# Patient Record
Sex: FEMALE | URBAN - METROPOLITAN AREA
[De-identification: names, ages, dates, MRNs, and addresses within clinical notes are randomized per-mention and may not be internally consistent; named-entity substitution may affect disease eponyms.]

---

## 2019-07-26 ENCOUNTER — INPATIENT (INPATIENT)
Facility: HOSPITAL | Age: 59
LOS: 2 days | Discharge: HOME | DRG: 379 | End: 2019-07-29
Attending: INTERNAL MEDICINE | Admitting: INTERNAL MEDICINE
Payer: COMMERCIAL

## 2019-07-26 VITALS
TEMPERATURE: 98 F | DIASTOLIC BLOOD PRESSURE: 81 MMHG | RESPIRATION RATE: 16 BRPM | SYSTOLIC BLOOD PRESSURE: 155 MMHG | HEIGHT: 63 IN | OXYGEN SATURATION: 96 % | HEART RATE: 77 BPM | WEIGHT: 149.03 LBS

## 2019-07-26 DIAGNOSIS — K92.2 GASTROINTESTINAL HEMORRHAGE, UNSPECIFIED: ICD-10-CM

## 2019-07-26 DIAGNOSIS — K62.5 HEMORRHAGE OF ANUS AND RECTUM: ICD-10-CM

## 2019-07-26 DIAGNOSIS — Z71.89 OTHER SPECIFIED COUNSELING: ICD-10-CM

## 2019-07-26 DIAGNOSIS — Z29.9 ENCOUNTER FOR PROPHYLACTIC MEASURES, UNSPECIFIED: ICD-10-CM

## 2019-07-26 DIAGNOSIS — Z98.890 OTHER SPECIFIED POSTPROCEDURAL STATES: Chronic | ICD-10-CM

## 2019-07-26 DIAGNOSIS — C50.919 MALIGNANT NEOPLASM OF UNSPECIFIED SITE OF UNSPECIFIED FEMALE BREAST: ICD-10-CM

## 2019-07-26 DIAGNOSIS — R10.13 EPIGASTRIC PAIN: ICD-10-CM

## 2019-07-26 DIAGNOSIS — Z90.49 ACQUIRED ABSENCE OF OTHER SPECIFIED PARTS OF DIGESTIVE TRACT: Chronic | ICD-10-CM

## 2019-07-26 DIAGNOSIS — I10 ESSENTIAL (PRIMARY) HYPERTENSION: ICD-10-CM

## 2019-07-26 LAB
ALBUMIN SERPL ELPH-MCNC: 3.5 G/DL — SIGNIFICANT CHANGE UP (ref 3.5–5)
ALP SERPL-CCNC: 148 U/L — HIGH (ref 40–120)
ALT FLD-CCNC: 29 U/L DA — SIGNIFICANT CHANGE UP (ref 10–60)
ANION GAP SERPL CALC-SCNC: 9 MMOL/L — SIGNIFICANT CHANGE UP (ref 5–17)
APTT BLD: 30.9 SEC — SIGNIFICANT CHANGE UP (ref 27.5–36.3)
AST SERPL-CCNC: 22 U/L — SIGNIFICANT CHANGE UP (ref 10–40)
BILIRUB SERPL-MCNC: 0.4 MG/DL — SIGNIFICANT CHANGE UP (ref 0.2–1.2)
BLD GP AB SCN SERPL QL: SIGNIFICANT CHANGE UP
BUN SERPL-MCNC: 11 MG/DL — SIGNIFICANT CHANGE UP (ref 7–18)
CALCIUM SERPL-MCNC: 8.7 MG/DL — SIGNIFICANT CHANGE UP (ref 8.4–10.5)
CHLORIDE SERPL-SCNC: 106 MMOL/L — SIGNIFICANT CHANGE UP (ref 96–108)
CO2 SERPL-SCNC: 24 MMOL/L — SIGNIFICANT CHANGE UP (ref 22–31)
CREAT SERPL-MCNC: 0.7 MG/DL — SIGNIFICANT CHANGE UP (ref 0.5–1.3)
GLUCOSE SERPL-MCNC: 96 MG/DL — SIGNIFICANT CHANGE UP (ref 70–99)
HCT VFR BLD CALC: 36.6 % — SIGNIFICANT CHANGE UP (ref 34.5–45)
HCT VFR BLD CALC: 37.6 % — SIGNIFICANT CHANGE UP (ref 34.5–45)
HGB BLD-MCNC: 12 G/DL — SIGNIFICANT CHANGE UP (ref 11.5–15.5)
HGB BLD-MCNC: 12.6 G/DL — SIGNIFICANT CHANGE UP (ref 11.5–15.5)
INR BLD: 1.11 RATIO — SIGNIFICANT CHANGE UP (ref 0.88–1.16)
MCHC RBC-ENTMCNC: 29.1 PG — SIGNIFICANT CHANGE UP (ref 27–34)
MCHC RBC-ENTMCNC: 29.3 PG — SIGNIFICANT CHANGE UP (ref 27–34)
MCHC RBC-ENTMCNC: 32.8 GM/DL — SIGNIFICANT CHANGE UP (ref 32–36)
MCHC RBC-ENTMCNC: 33.5 GM/DL — SIGNIFICANT CHANGE UP (ref 32–36)
MCV RBC AUTO: 87.4 FL — SIGNIFICANT CHANGE UP (ref 80–100)
MCV RBC AUTO: 88.6 FL — SIGNIFICANT CHANGE UP (ref 80–100)
NRBC # BLD: 0 /100 WBCS — SIGNIFICANT CHANGE UP (ref 0–0)
OB PNL STL: NEGATIVE — SIGNIFICANT CHANGE UP
PLATELET # BLD AUTO: 298 K/UL — SIGNIFICANT CHANGE UP (ref 150–400)
PLATELET # BLD AUTO: 319 K/UL — SIGNIFICANT CHANGE UP (ref 150–400)
POTASSIUM SERPL-MCNC: 3.9 MMOL/L — SIGNIFICANT CHANGE UP (ref 3.5–5.3)
POTASSIUM SERPL-SCNC: 3.9 MMOL/L — SIGNIFICANT CHANGE UP (ref 3.5–5.3)
PROT SERPL-MCNC: 7.5 G/DL — SIGNIFICANT CHANGE UP (ref 6–8.3)
PROTHROM AB SERPL-ACNC: 12.4 SEC — SIGNIFICANT CHANGE UP (ref 10–12.9)
RBC # BLD: 4.13 M/UL — SIGNIFICANT CHANGE UP (ref 3.8–5.2)
RBC # BLD: 4.3 M/UL — SIGNIFICANT CHANGE UP (ref 3.8–5.2)
RBC # FLD: 13.8 % — SIGNIFICANT CHANGE UP (ref 10.3–14.5)
RBC # FLD: 14.1 % — SIGNIFICANT CHANGE UP (ref 10.3–14.5)
SODIUM SERPL-SCNC: 139 MMOL/L — SIGNIFICANT CHANGE UP (ref 135–145)
WBC # BLD: 5.46 K/UL — SIGNIFICANT CHANGE UP (ref 3.8–10.5)
WBC # BLD: 6.35 K/UL — SIGNIFICANT CHANGE UP (ref 3.8–10.5)
WBC # FLD AUTO: 5.46 K/UL — SIGNIFICANT CHANGE UP (ref 3.8–10.5)
WBC # FLD AUTO: 6.35 K/UL — SIGNIFICANT CHANGE UP (ref 3.8–10.5)

## 2019-07-26 PROCEDURE — 99283 EMERGENCY DEPT VISIT LOW MDM: CPT

## 2019-07-26 RX ORDER — ENOXAPARIN SODIUM 100 MG/ML
40 INJECTION SUBCUTANEOUS DAILY
Refills: 0 | Status: DISCONTINUED | OUTPATIENT
Start: 2019-07-26 | End: 2019-07-26

## 2019-07-26 RX ORDER — LISINOPRIL 2.5 MG/1
20 TABLET ORAL DAILY
Refills: 0 | Status: DISCONTINUED | OUTPATIENT
Start: 2019-07-26 | End: 2019-07-29

## 2019-07-26 RX ORDER — PANTOPRAZOLE SODIUM 20 MG/1
40 TABLET, DELAYED RELEASE ORAL
Refills: 0 | Status: DISCONTINUED | OUTPATIENT
Start: 2019-07-26 | End: 2019-07-29

## 2019-07-26 RX ORDER — SODIUM CHLORIDE 9 MG/ML
1000 INJECTION INTRAMUSCULAR; INTRAVENOUS; SUBCUTANEOUS
Refills: 0 | Status: DISCONTINUED | OUTPATIENT
Start: 2019-07-26 | End: 2019-07-29

## 2019-07-26 NOTE — ED ADULT NURSE NOTE - NSIMPLEMENTINTERV_GEN_ALL_ED
Implemented All Universal Safety Interventions:  [place] to call system. Call bell, personal items and telephone within reach. Instruct patient to call for assistance. Room bathroom lighting operational. Non-slip footwear when patient is off stretcher. Physically safe environment: no spills, clutter or unnecessary equipment. Stretcher in lowest position, wheels locked, appropriate side rails in place.

## 2019-07-26 NOTE — H&P ADULT - NSHPPHYSICALEXAM_GEN_ALL_CORE
Vital Signs Last 24 Hrs  T(C): 36.7 (26 Jul 2019 15:19), Max: 36.8 (26 Jul 2019 12:27)  T(F): 98 (26 Jul 2019 15:19), Max: 98.2 (26 Jul 2019 12:27)  HR: 73 (26 Jul 2019 15:19) (73 - 77)  BP: 110/90 (26 Jul 2019 15:19) (110/90 - 155/81)  BP(mean): --  RR: 17 (26 Jul 2019 15:19) (16 - 17)  SpO2: 96% (26 Jul 2019 15:19) (96% - 96%)        PHYSICAL EXAM:  GENERAL: female in bed in no acute distress   HEENT: Normocephalic;  conjunctivae and sclerae clear; moist mucous membranes;   NECK : supple  CHEST/LUNG: Clear to auscultation bilaterally with good air entry , chemo port on right side   breast: left side: surgical scar present above left nipple and left armpit   HEART: S1 S2  regular; no murmurs, gallops or rubs  ABDOMEN: Soft, tenderness in epigastric region , Nondistended; Bowel sounds present, mid abdominal surgical scar present   EXTREMITIES: no cyanosis; no edema; no calf tenderness  SKIN: warm and dry; no rash  NERVOUS SYSTEM:  Awake and alert; Oriented  to place, person and time ; no new deficits Vital Signs Last 24 Hrs  T(C): 36.7 (26 Jul 2019 15:19), Max: 36.8 (26 Jul 2019 12:27)  T(F): 98 (26 Jul 2019 15:19), Max: 98.2 (26 Jul 2019 12:27)  HR: 73 (26 Jul 2019 15:19) (73 - 77)  BP: 110/90 (26 Jul 2019 15:19) (110/90 - 155/81)  RR: 17 (26 Jul 2019 15:19) (16 - 17)  SpO2: 96% (26 Jul 2019 15:19) (96% - 96%)        PHYSICAL EXAM:  GENERAL: elderly female in bed in no acute distress   HEENT: Normocephalic;  conjunctivae and sclerae clear; moist mucous membranes;   NECK : supple  CHEST/LUNG: Clear to auscultation bilaterally with good air entry , chemo port on right side   breast: left side: surgical scar present above left nipple and left armpit   HEART: S1 S2  regular; no murmurs, gallops or rubs  ABDOMEN: Soft, tenderness in epigastric region , Nondistended; Bowel sounds present, mid abdominal surgical scar present   EXTREMITIES: no cyanosis; no edema; no calf tenderness  SKIN: warm and dry; no rash  NERVOUS SYSTEM:  Awake and alert; Oriented  to place, person and time ; no new deficits

## 2019-07-26 NOTE — H&P ADULT - PROBLEM SELECTOR PLAN 2
-Epigastric pain, radiating to throat,   - EGD in April : gastritis   -c/w protonix   Dr Lopez consulted

## 2019-07-26 NOTE — H&P ADULT - PROBLEM SELECTOR PLAN 6
IMPROVE VTE Individual Risk Assessment  RISK                                                                Points  [  ] Previous VTE                                                  3  [  ] Thrombophilia                                               2  [  ] Lower limb paralysis                                      2        (unable to hold up >15 seconds)    [x  ] Current Cancer                                              2         (within 6 months)  [x  ] Immobilization > 24 hrs                                1  [  ] ICU/CCU stay > 24 hours                              1  [x  ] Age > 60                                                      1  IMPROVE VTE Score _____4, but DVT proph held in setting of GI bleed

## 2019-07-26 NOTE — ED ADULT NURSE NOTE - ED STAT RN HANDOFF DETAILS
Patient admitted to medicine in no acute distress assigned to 6 s report given to NIKOLAY Garcia. Awaiting patient to be transported to floor.

## 2019-07-26 NOTE — ED PROVIDER NOTE - OBJECTIVE STATEMENT
70 yo female with pmh of left sided breast ca on chemo presents to the ED for rectal bleeding for 3 days. Pt states she has rectal bleeding whenever she has a BM, states that she sees bright red blood in the toilet bowel. Pt also states she spit up blood tinged salvia for 3 days.  She denies abd pain, fevers, N/V/D, chest pain, sob, weakness, dizziness, dysuria, hematuria.  Last chemo was on 7/22/19. Spoke with Dr. Graff, pts oncologist who states that he wants the pt admitted for further workup.

## 2019-07-26 NOTE — H&P ADULT - ASSESSMENT
68 yo female with PMH of left breast Cancer (Dx in 2000, s/p Lumpectomy/Lymphadenopathy with left breast Cancer relapse in May on chemo presently), HTN sent from blood in stool and  blood tinged saliva and epigastric pain     GOC : DNR/DNI,  [place] in charts

## 2019-07-26 NOTE — H&P ADULT - ATTENDING COMMENTS
Patient seen and examined in ED with daughter at bedside. Patient's history, vitals, labs, imaging studies reviewed. Discussed with above resident, agree with note with edits, educated on the diagnosis and management of above medical conditions. F/u CT A/P. Plan of care discussed with patient, and agrees, all questions answered.   Ankita Rodriguez MD Patient seen and examined in ED with daughter at bedside. Patient's history, vitals, labs, imaging studies reviewed. Discussed with above resident, agree with note with edits, educated on the diagnosis and management of above medical conditions. F/u CT A/P. Plan of care discussed with patient, and daughter at bedside, agrees, all questions answered.   Ankita Rodriguez MD Patient seen and examined in ED with daughter at bedside. Patient's history, vitals, labs, imaging studies reviewed. Discussed with above resident, agree with note with edits, educated on the diagnosis and management of above medical conditions. F/u CT A/P. Plan of care discussed with patient, and daughter at bedside, agrees, all questions answered.   > Advance directives: DNR/DNI. Advance care planning discussion done in detail. Time spent: 35 minutes. MOLST form signed.  Ankita Rodriguez MD

## 2019-07-26 NOTE — H&P ADULT - HISTORY OF PRESENT ILLNESS
68 yo female with PMH of left breast Cancer(Dx in 2000, s/p Lumpectomy/Lymphadenopathy with left breast Cancer relapse in May on chemo presently), HTN sent from blood in stool and  blood tinged saliva. Patient noticed her "stool covered in blood and blood in Toilet bowl" from last 2 days. This morning she also noticed black stool with increased in amount of blood. She also has Lower abdominal pain, 8/10 in severity while passing her bowel from last 3 weeks. She also noticed her saliva being blood tinged but denies any cough from last 3 days. She went to see Dr Graff in OP setting for these complains and was referred to ED. In ED, her rectal EXAM ( performed by ED doc) was negative for any bleeding and fecal occult was negative as well.   Patient was first diagnosed with left breast cancer in 2000, had lumpectomy and Lymphadenopathy, radiation at that time and was on Tamoxifen for 5 years. In May, this year she was found to relapse of left breast cancer, stage 1 and was started on chemo, one a week, 4 course so far, Last day of chemo was on Monday.     She also complains of epigastric pain from last few months. 8/10 in severity, radiating to the throat, worse empty stomach, relieved with meals, present 3-4 times a week but has been more frequent lately. She has history of peptic ulcer 20 years back, treated with Protonix, denies history of GI bleed. She had EGD in April and was normal with some gastritis as per the patient. Her last colonoscopy was in 2016, with some polyp removed and was recommended to have repeat colonoscopy in 5 year.     She complains of ear pain, diarrhea(improved with medication)  since she is been on chemo  but overall has been tolerating chemo well. 68 yo female with PMH of left breast Cancer (Dx in 2000, s/p Lumpectomy/Lymphadenopathy with left breast Cancer relapse in May on chemo presently), HTN sent from blood in stool and  blood tinged saliva. Patient noticed her "stool covered in blood and blood in Toilet bowl" from last 2 days. This morning she also noticed black stool with increased in amount of blood. She also has Lower abdominal pain, 8/10 in severity while passing her bowel from last 3 weeks. She also noticed her saliva being blood tinged but denies any cough from last 3 days. She went to see Dr Graff in OP setting for these complains and was referred to ED. In ED, her rectal EXAM (performed by ED doc) was negative for any bleeding and fecal occult was negative as well.   Patient was first diagnosed with left breast cancer in 2000, had lumpectomy and Lymphadenopathy, radiation at that time and was on Tamoxifen for 5 years. In May, this year she was found to relapse of left breast cancer, stage 1 and was started on chemo, one a week, 4 course so far, Last day of chemo was on Monday.     She also complains of epigastric pain from last few months. 8/10 in severity, radiating to the throat, worse empty stomach, relieved with meals, present 3-4 times a week but has been more frequent lately. She has history of peptic ulcer 20 years back, treated with Protonix, denies history of GI bleed. She had EGD in April and was normal with some gastritis as per the patient. Her last colonoscopy was in 2016, with some polyp removed and was recommended to have repeat colonoscopy in 5 year.     She complains of ear pain, diarrhea (improved with medication)  since she is been on chemo  but overall has been tolerating chemo well.

## 2019-07-26 NOTE — H&P ADULT - PROBLEM SELECTOR PLAN 1
-blood tinged stool from last 2 days  - MT Exam: no blood , FOBT: neg   - Hb 12.6, repeat : 12.0  - will monitor Cbc q12   c/w diet for now   - IV hydration gentle   - Dr Lopez consulted   - CT abdomen  -GI follow up for ?plan for colonoscopy/EGD

## 2019-07-26 NOTE — H&P ADULT - NSICDXPASTSURGICALHX_GEN_ALL_CORE_FT
PAST SURGICAL HISTORY:  H/O lumpectomy left breast lumpectomy with lymphadenectomy    History of cholecystectomy

## 2019-07-26 NOTE — ED ADULT NURSE NOTE - OBJECTIVE STATEMENT
pt is here for rectal bleeding.  pt stated that rectal bleeding since yesterday, denied N/V/D or sob, denied dizziness or chest pain, cancer pt, pt calm at this time.

## 2019-07-27 LAB
24R-OH-CALCIDIOL SERPL-MCNC: 21.9 NG/ML — LOW (ref 30–80)
ALBUMIN SERPL ELPH-MCNC: 3 G/DL — LOW (ref 3.5–5)
ALP SERPL-CCNC: 113 U/L — SIGNIFICANT CHANGE UP (ref 40–120)
ALT FLD-CCNC: 24 U/L DA — SIGNIFICANT CHANGE UP (ref 10–60)
ANION GAP SERPL CALC-SCNC: 4 MMOL/L — LOW (ref 5–17)
APPEARANCE UR: CLEAR — SIGNIFICANT CHANGE UP
AST SERPL-CCNC: 15 U/L — SIGNIFICANT CHANGE UP (ref 10–40)
BASOPHILS # BLD AUTO: 0.02 K/UL — SIGNIFICANT CHANGE UP (ref 0–0.2)
BASOPHILS # BLD AUTO: 0.03 K/UL — SIGNIFICANT CHANGE UP (ref 0–0.2)
BASOPHILS NFR BLD AUTO: 0.4 % — SIGNIFICANT CHANGE UP (ref 0–2)
BASOPHILS NFR BLD AUTO: 0.5 % — SIGNIFICANT CHANGE UP (ref 0–2)
BILIRUB SERPL-MCNC: 0.6 MG/DL — SIGNIFICANT CHANGE UP (ref 0.2–1.2)
BILIRUB UR-MCNC: NEGATIVE — SIGNIFICANT CHANGE UP
BUN SERPL-MCNC: 11 MG/DL — SIGNIFICANT CHANGE UP (ref 7–18)
CALCIUM SERPL-MCNC: 8 MG/DL — LOW (ref 8.4–10.5)
CHLORIDE SERPL-SCNC: 111 MMOL/L — HIGH (ref 96–108)
CHOLEST SERPL-MCNC: 168 MG/DL — SIGNIFICANT CHANGE UP (ref 10–199)
CO2 SERPL-SCNC: 26 MMOL/L — SIGNIFICANT CHANGE UP (ref 22–31)
COLOR SPEC: YELLOW — SIGNIFICANT CHANGE UP
CREAT SERPL-MCNC: 0.71 MG/DL — SIGNIFICANT CHANGE UP (ref 0.5–1.3)
DIFF PNL FLD: NEGATIVE — SIGNIFICANT CHANGE UP
EOSINOPHIL # BLD AUTO: 0.05 K/UL — SIGNIFICANT CHANGE UP (ref 0–0.5)
EOSINOPHIL # BLD AUTO: 0.06 K/UL — SIGNIFICANT CHANGE UP (ref 0–0.5)
EOSINOPHIL NFR BLD AUTO: 0.9 % — SIGNIFICANT CHANGE UP (ref 0–6)
EOSINOPHIL NFR BLD AUTO: 1 % — SIGNIFICANT CHANGE UP (ref 0–6)
FOLATE SERPL-MCNC: >20 NG/ML — SIGNIFICANT CHANGE UP
GLUCOSE SERPL-MCNC: 85 MG/DL — SIGNIFICANT CHANGE UP (ref 70–99)
GLUCOSE UR QL: NEGATIVE — SIGNIFICANT CHANGE UP
HBA1C BLD-MCNC: 5.6 % — SIGNIFICANT CHANGE UP (ref 4–5.6)
HCT VFR BLD CALC: 34.2 % — LOW (ref 34.5–45)
HCT VFR BLD CALC: 35.1 % — SIGNIFICANT CHANGE UP (ref 34.5–45)
HCV AB S/CO SERPL IA: 0.07 S/CO — SIGNIFICANT CHANGE UP (ref 0–0.99)
HCV AB SERPL-IMP: SIGNIFICANT CHANGE UP
HDLC SERPL-MCNC: 38 MG/DL — LOW
HGB BLD-MCNC: 11.2 G/DL — LOW (ref 11.5–15.5)
HGB BLD-MCNC: 11.7 G/DL — SIGNIFICANT CHANGE UP (ref 11.5–15.5)
IMM GRANULOCYTES NFR BLD AUTO: 0.2 % — SIGNIFICANT CHANGE UP (ref 0–1.5)
IMM GRANULOCYTES NFR BLD AUTO: 0.3 % — SIGNIFICANT CHANGE UP (ref 0–1.5)
KETONES UR-MCNC: ABNORMAL
LEUKOCYTE ESTERASE UR-ACNC: ABNORMAL
LIPID PNL WITH DIRECT LDL SERPL: 107 MG/DL — SIGNIFICANT CHANGE UP
LYMPHOCYTES # BLD AUTO: 1.79 K/UL — SIGNIFICANT CHANGE UP (ref 1–3.3)
LYMPHOCYTES # BLD AUTO: 1.88 K/UL — SIGNIFICANT CHANGE UP (ref 1–3.3)
LYMPHOCYTES # BLD AUTO: 28.1 % — SIGNIFICANT CHANGE UP (ref 13–44)
LYMPHOCYTES # BLD AUTO: 36.2 % — SIGNIFICANT CHANGE UP (ref 13–44)
MAGNESIUM SERPL-MCNC: 2.3 MG/DL — SIGNIFICANT CHANGE UP (ref 1.6–2.6)
MCHC RBC-ENTMCNC: 28.9 PG — SIGNIFICANT CHANGE UP (ref 27–34)
MCHC RBC-ENTMCNC: 29.6 PG — SIGNIFICANT CHANGE UP (ref 27–34)
MCHC RBC-ENTMCNC: 32.7 GM/DL — SIGNIFICANT CHANGE UP (ref 32–36)
MCHC RBC-ENTMCNC: 33.3 GM/DL — SIGNIFICANT CHANGE UP (ref 32–36)
MCV RBC AUTO: 88.4 FL — SIGNIFICANT CHANGE UP (ref 80–100)
MCV RBC AUTO: 88.9 FL — SIGNIFICANT CHANGE UP (ref 80–100)
MONOCYTES # BLD AUTO: 0.23 K/UL — SIGNIFICANT CHANGE UP (ref 0–0.9)
MONOCYTES # BLD AUTO: 0.24 K/UL — SIGNIFICANT CHANGE UP (ref 0–0.9)
MONOCYTES NFR BLD AUTO: 3.6 % — SIGNIFICANT CHANGE UP (ref 2–14)
MONOCYTES NFR BLD AUTO: 4.6 % — SIGNIFICANT CHANGE UP (ref 2–14)
NEUTROPHILS # BLD AUTO: 3 K/UL — SIGNIFICANT CHANGE UP (ref 1.8–7.4)
NEUTROPHILS # BLD AUTO: 4.23 K/UL — SIGNIFICANT CHANGE UP (ref 1.8–7.4)
NEUTROPHILS NFR BLD AUTO: 57.6 % — SIGNIFICANT CHANGE UP (ref 43–77)
NEUTROPHILS NFR BLD AUTO: 66.6 % — SIGNIFICANT CHANGE UP (ref 43–77)
NITRITE UR-MCNC: NEGATIVE — SIGNIFICANT CHANGE UP
NRBC # BLD: 0 /100 WBCS — SIGNIFICANT CHANGE UP (ref 0–0)
PH UR: 7 — SIGNIFICANT CHANGE UP (ref 5–8)
PHOSPHATE SERPL-MCNC: 3.3 MG/DL — SIGNIFICANT CHANGE UP (ref 2.5–4.5)
PLATELET # BLD AUTO: 273 K/UL — SIGNIFICANT CHANGE UP (ref 150–400)
PLATELET # BLD AUTO: 286 K/UL — SIGNIFICANT CHANGE UP (ref 150–400)
POTASSIUM SERPL-MCNC: 3.8 MMOL/L — SIGNIFICANT CHANGE UP (ref 3.5–5.3)
POTASSIUM SERPL-SCNC: 3.8 MMOL/L — SIGNIFICANT CHANGE UP (ref 3.5–5.3)
PROT SERPL-MCNC: 6.1 G/DL — SIGNIFICANT CHANGE UP (ref 6–8.3)
PROT UR-MCNC: NEGATIVE — SIGNIFICANT CHANGE UP
RBC # BLD: 3.87 M/UL — SIGNIFICANT CHANGE UP (ref 3.8–5.2)
RBC # BLD: 3.95 M/UL — SIGNIFICANT CHANGE UP (ref 3.8–5.2)
RBC # FLD: 13.8 % — SIGNIFICANT CHANGE UP (ref 10.3–14.5)
RBC # FLD: 13.9 % — SIGNIFICANT CHANGE UP (ref 10.3–14.5)
SODIUM SERPL-SCNC: 141 MMOL/L — SIGNIFICANT CHANGE UP (ref 135–145)
SP GR SPEC: 1 — LOW (ref 1.01–1.02)
TOTAL CHOLESTEROL/HDL RATIO MEASUREMENT: 4.4 RATIO — SIGNIFICANT CHANGE UP (ref 3.3–7.1)
TRIGL SERPL-MCNC: 114 MG/DL — SIGNIFICANT CHANGE UP (ref 10–149)
TSH SERPL-MCNC: 1.66 UU/ML — SIGNIFICANT CHANGE UP (ref 0.34–4.82)
UROBILINOGEN FLD QL: NEGATIVE — SIGNIFICANT CHANGE UP
VIT B12 SERPL-MCNC: 535 PG/ML — SIGNIFICANT CHANGE UP (ref 232–1245)
WBC # BLD: 5.2 K/UL — SIGNIFICANT CHANGE UP (ref 3.8–10.5)
WBC # BLD: 6.36 K/UL — SIGNIFICANT CHANGE UP (ref 3.8–10.5)
WBC # FLD AUTO: 5.2 K/UL — SIGNIFICANT CHANGE UP (ref 3.8–10.5)
WBC # FLD AUTO: 6.36 K/UL — SIGNIFICANT CHANGE UP (ref 3.8–10.5)

## 2019-07-27 NOTE — PROGRESS NOTE ADULT - SUBJECTIVE AND OBJECTIVE BOX
Patient is a 69 year old  Female who presents with a chief complaint of blood in stool and blood tinged saliva (2019 21:34)        MEDICATIONS  (STANDING):  lisinopril 20 milliGRAM(s) Oral daily  pantoprazole    Tablet 40 milliGRAM(s) Oral before breakfast  sodium chloride 0.9%. 1000 milliLiter(s) (75 mL/Hr) IV Continuous <Continuous>    MEDICATIONS  (PRN):      REVIEW OF SYSTEMS:  CONSTITUTIONAL: No fever, weight loss, or fatigue  EYES: No eye pain, visual disturbances, or discharge  ENMT:  No difficulty hearing, tinnitus, vertigo; No sinus or throat pain  NECK: No pain or stiffness  RESPIRATORY: No cough, wheezing, chills or hemoptysis; No shortness of breath  CARDIOVASCULAR: No chest pain, palpitations, dizziness, or leg swelling  GASTROINTESTINAL: No abdominal or epigastric pain. No nausea, vomiting, or hematemesis; No diarrhea or constipation. No melena or hematochezia.  GENITOURINARY: No dysuria, frequency, hematuria, or incontinence  NEUROLOGICAL: No headaches, memory loss, loss of strength, numbness, or tremors  SKIN: No itching, burning, rashes, or lesions   LYMPH NODES: No enlarged glands  ENDOCRINE: No heat or cold intolerance; No hair loss  MUSCULOSKELETAL: No joint pain or swelling; No muscle, back, or extremity pain  PSYCHIATRIC: No depression, anxiety, mood swings, or difficulty sleeping  HEME/LYMPH: No easy bruising, or bleeding gums  ALLERY AND IMMUNOLOGIC: No hives or eczema    PHYSICAL EXAM:    T(C): 36.6 (19 @ 20:56), Max: 36.8 (19 @ 04:48)  HR: 71 (19 @ 20:56) (62 - 78)  BP: 117/68 (19 @ 20:56) (108/64 - 117/78)  RR: 16 (19 @ 20:56) (16 - 17)  SpO2: 98% (19 @ 20:56) (98% - 98%)        GENERAL: NAD, well-groomed, well-developed  HEAD:  Atraumatic, Normocephalic  EYES: EOMI, PERRL, conjunctiva and sclera clear  ENMT: No tonsillar erythema, exudates, or enlargement; Moist mucous membranes, Good dentition, No lesions  NECK: Supple, No JVD, Normal thyroid  NERVOUS SYSTEM:  Alert & Oriented X3, Good concentration; Motor Strength 5/5 B/L upper and lower extremities; DTRs 2+ intact and symmetric  CHEST/LUNG: Clear to ascultation bilaterally; No rales, rhonchi, wheezing, or rubs  HEART: Regular rate and rhythm; No murmurs, rubs, or gallops  ABDOMEN: Soft, Nontender, Nondistended; Bowel sounds present  EXTREMITIES:  2+ Peripheral Pulses, No clubbing, cyanosis, or edema  SKIN: No rashes or lesions    LABS:                        11.7   6.36  )-----------( 286      ( 2019 18:25 )             35.1         141  |  111<H>  |  11  ----------------------------<  85  3.8   |  26  |  0.71    Ca    8.0<L>      2019 07:10  Phos  3.3       Mg     2.3         TPro  6.1  /  Alb  3.0<L>  /  TBili  0.6  /  DBili  x   /  AST  15  /  ALT  24  /  AlkPhos  113      PT/INR - ( 2019 16:48 )   PT: 12.4 sec;   INR: 1.11 ratio         PTT - ( 2019 16:48 )  PTT:30.9 sec  Urinalysis Basic - ( 2019 03:33 )    Color: Yellow / Appearance: Clear / S.005 / pH: x  Gluc: x / Ketone: Trace  / Bili: Negative / Urobili: Negative   Blood: x / Protein: Negative / Nitrite: Negative   Leuk Esterase: Trace / RBC: 0-2 /HPF / WBC 3-5 /HPF   Sq Epi: x / Non Sq Epi: Few /HPF / Bacteria: Moderate /HPF      RADIOLOGY & ADDITIONAL TESTS:      Consultant(s) Notes Reviewed:  [x] YES  [ ] NO    Care Discussed with Consultants/Other Providers [x] YES  [ ] NO Patient is a 69 year old  Female who presents with a chief complaint of blood in stool and blood tinged saliva (2019 21:34)    Patient seen and examined reports feels better today, denies blood in stool or saliva today    MEDICATIONS  (STANDING):  lisinopril 20 milliGRAM(s) Oral daily  pantoprazole    Tablet 40 milliGRAM(s) Oral before breakfast  sodium chloride 0.9%. 1000 milliLiter(s) (75 mL/Hr) IV Continuous <Continuous>      REVIEW OF SYSTEMS:  CONSTITUTIONAL: No fever, weight loss, mild fatigue  EYES: No eye pain, visual disturbances, or discharge  ENMT:  No difficulty hearing, tinnitus, vertigo; No sinus or throat pain  NECK: No pain or stiffness  RESPIRATORY: No cough, wheezing, chills or hemoptysis; No shortness of breath  CARDIOVASCULAR: No chest pain, palpitations, dizziness, or leg swelling  GASTROINTESTINAL: No abdominal or epigastric pain. No nausea, vomiting, or hematemesis; No diarrhea or constipation. No melena or hematochezia.  GENITOURINARY: No dysuria, frequency, hematuria, or incontinence  NEUROLOGICAL: No headaches, memory loss, loss of strength, numbness, or tremors  SKIN: No itching, burning, rashes, or lesions   ENDOCRINE: No heat or cold intolerance; No hair loss  MUSCULOSKELETAL: No joint pain or swelling; No muscle, back, or extremity pain  PSYCHIATRIC: No depression, anxiety, mood swings, or difficulty sleeping  HEME/LYMPH: No easy bruising, or bleeding gums  ALLERGY AND IMMUNOLOGIC: No hives or eczema    PHYSICAL EXAM:    T(C): 36.6 (19 @ 20:56), Max: 36.8 (19 @ 04:48)  HR: 71 (19 @ 20:56) (62 - 78)  BP: 117/68 (19 @ 20:56) (108/64 - 117/78)  RR: 16 (19 @ 20:56) (16 - 17)  SpO2: 98% (19 @ 20:56) (98% - 98%)        GENERAL: NAD, well-groomed, well-developed  HEAD:  Atraumatic, Normocephalic  EYES: EOMI, PERRL, conjunctiva and sclera clear  ENMT: No tonsillar erythema, exudates, or enlargement; Moist mucous membranes, Good dentition, No lesions  NECK: Supple, No JVD, Normal thyroid  NERVOUS SYSTEM:  Alert & Oriented X3, Good concentration; Motor Strength 5/5 B/L upper and lower extremities; DTRs 2+ intact and symmetric  CHEST/LUNG: Clear to ascultation bilaterally; No rales, rhonchi, wheezing, or rubs  HEART: Regular rate and rhythm; No murmurs, rubs, or gallops  ABDOMEN: Soft, Nontender, Nondistended; Bowel sounds present  EXTREMITIES:  2+ Peripheral Pulses, No clubbing, cyanosis, or edema  SKIN: No rash    LABS:                        11.7   6.36  )-----------( 286      ( 2019 18:25 )             35.1         141  |  111<H>  |  11  ----------------------------<  85  3.8   |  26  |  0.71    Ca    8.0<L>      2019 07:10  Phos  3.3       Mg     2.3         TPro  6.1  /  Alb  3.0<L>  /  TBili  0.6  /  DBili  x   /  AST  15  /  ALT  24  /  AlkPhos  113      PT/INR - ( 2019 16:48 )   PT: 12.4 sec;   INR: 1.11 ratio         PTT - ( 2019 16:48 )  PTT:30.9 sec  Urinalysis Basic - ( 2019 03:33 )    Color: Yellow / Appearance: Clear / S.005 / pH: x  Gluc: x / Ketone: Trace  / Bili: Negative / Urobili: Negative   Blood: x / Protein: Negative / Nitrite: Negative   Leuk Esterase: Trace / RBC: 0-2 /HPF / WBC 3-5 /HPF   Sq Epi: x / Non Sq Epi: Few /HPF / Bacteria: Moderate /HPF      RADIOLOGY & ADDITIONAL TESTS:      Consultant(s) Notes Reviewed:  [x] YES  [ ] NO    Care Discussed with Consultants/Other Providers [x] YES  [ ] NO

## 2019-07-27 NOTE — CONSULT NOTE ADULT - SUBJECTIVE AND OBJECTIVE BOX
69 year old lady with Her-2 positive breast cancer on Taxol, herceptin and perjeta chemotherapy developed rectal bleeding and spitting some blood yesterday.  No fever or chills.  No sob, N/V, but she does have loose BM.   she also feel some soreness and dryness in throat.  Patient is a 69y old  Female who presents with a chief complaint of blood in stool and blood tinged saliva (26 Jul 2019 17:29)      HPI:  68 yo female with PMH of left breast Cancer (Dx in 2000, s/p Lumpectomy/Lymphadenopathy with left breast Cancer relapse in May on chemo presently), HTN sent from blood in stool and  blood tinged saliva. Patient noticed her "stool covered in blood and blood in Toilet bowl" from last 2 days. This morning she also noticed black stool with increased in amount of blood. She also has Lower abdominal pain, 8/10 in severity while passing her bowel from last 3 weeks. She also noticed her saliva being blood tinged but denies any cough from last 3 days. She went to see Dr Graff in OP setting for these complains and was referred to ED. In ED, her rectal EXAM (performed by ED doc) was negative for any bleeding and fecal occult was negative as well.   Patient was first diagnosed with left breast cancer in 2000, had lumpectomy and Lymphadenopathy, radiation at that time and was on Tamoxifen for 5 years. In May, this year she was found to relapse of left breast cancer, stage 1 and was started on chemo, one a week, 4 course so far, Last day of chemo was on Monday.     She also complains of epigastric pain from last few months. 8/10 in severity, radiating to the throat, worse empty stomach, relieved with meals, present 3-4 times a week but has been more frequent lately. She has history of peptic ulcer 20 years back, treated with Protonix, denies history of GI bleed. She had EGD in April and was normal with some gastritis as per the patient. Her last colonoscopy was in 2016, with some polyp removed and was recommended to have repeat colonoscopy in 5 year.     She complains of ear pain, diarrhea (improved with medication)  since she is been on chemo  but overall has been tolerating chemo well. (26 Jul 2019 17:29)       ROS:  Negative except for:    PAST MEDICAL & SURGICAL HISTORY:  HTN (hypertension)  Breast cancer: left,  History of cholecystectomy  H/O lumpectomy: left breast lumpectomy with lymphadenectomy      SOCIAL HISTORY:    FAMILY HISTORY:  FH: uterine cancer: mother      MEDICATIONS  (STANDING):  lisinopril 20 milliGRAM(s) Oral daily  pantoprazole    Tablet 40 milliGRAM(s) Oral before breakfast  sodium chloride 0.9%. 1000 milliLiter(s) (75 mL/Hr) IV Continuous <Continuous>    MEDICATIONS  (PRN):      Allergies    No Known Allergies    Intolerances        Vital Signs Last 24 Hrs  T(C): 36.6 (27 Jul 2019 14:14), Max: 36.8 (27 Jul 2019 04:48)  T(F): 97.8 (27 Jul 2019 14:14), Max: 98.3 (27 Jul 2019 04:48)  HR: 78 (27 Jul 2019 14:14) (62 - 78)  BP: 117/78 (27 Jul 2019 14:14) (108/64 - 133/59)  BP(mean): --  RR: 17 (27 Jul 2019 14:14) (16 - 18)  SpO2: 98% (27 Jul 2019 14:14) (98% - 98%)    PHYSICAL EXAM  General: adult in NAD  HEENT: clear oropharynx, anicteric sclera, pink conjunctiva  Neck: supple  CV: normal S1/S2 with no murmur rubs or gallops  Lungs: positive air movement b/l ant lungs,clear to auscultation, no wheezes, no rales  Abdomen: soft non-tender non-distended, no hepatosplenomegaly  Ext: no clubbing cyanosis or edema  Skin: no rashes and no petechiae  Neuro: alert and oriented X 4, no focal deficits      LABS:                          11.2   5.20  )-----------( 273      ( 27 Jul 2019 07:10 )             34.2         Mean Cell Volume : 88.4 fl  Mean Cell Hemoglobin : 28.9 pg  Mean Cell Hemoglobin Concentration : 32.7 gm/dL  Auto Neutrophil # : 3.00 K/uL  Auto Lymphocyte # : 1.88 K/uL  Auto Monocyte # : 0.24 K/uL  Auto Eosinophil # : 0.05 K/uL  Auto Basophil # : 0.02 K/uL  Auto Neutrophil % : 57.6 %  Auto Lymphocyte % : 36.2 %  Auto Monocyte % : 4.6 %  Auto Eosinophil % : 1.0 %  Auto Basophil % : 0.4 %      Serial CBC's  07-27 @ 07:10  Hct-34.2 / Hgb-11.2 / Plat-273 / RBC-3.87 / WBC-5.20  Serial CBC's  07-26 @ 20:46  Hct-36.6 / Hgb-12.0 / Plat-298 / RBC-4.13 / WBC-5.46  Serial CBC's  07-26 @ 13:18  Hct-37.6 / Hgb-12.6 / Plat-319 / RBC-4.30 / WBC-6.35      07-27    141  |  111<H>  |  11  ----------------------------<  85  3.8   |  26  |  0.71    Ca    8.0<L>      27 Jul 2019 07:10  Phos  3.3     07-27  Mg     2.3     07-27    TPro  6.1  /  Alb  3.0<L>  /  TBili  0.6  /  DBili  x   /  AST  15  /  ALT  24  /  AlkPhos  113  07-27      PT/INR - ( 26 Jul 2019 16:48 )   PT: 12.4 sec;   INR: 1.11 ratio         PTT - ( 26 Jul 2019 16:48 )  PTT:30.9 sec    Vitamin B12, Serum: 535 pg/mL (07-27 @ 09:36)  Folate, Serum: >20.0 ng/mL (07-27 @ 09:36)              BLOOD SMEAR INTERPRETATION:       RADIOLOGY & ADDITIONAL STUDIES:
Patient is a 69y old  Female who presents with a chief complaint of blood in stool and blood tinged saliva (27 Jul 2019 22:04)      68 yo female with PMH of left breast Cancer (Dx in 2000, s/p Lumpectomy/Lymphadenopathy with left breast Cancer relapse in May on chemo presently), HTN sent from blood in stool and  blood tinged saliva. Patient noticed her "stool covered in blood and blood in Toilet bowl" from last 2 days. This morning she also noticed black stool with increased in amount of blood. She also has Lower abdominal pain, 8/10 in severity while passing her bowel from last 3 weeks. Patient was first diagnosed with left breast cancer in 2000, had lumpectomy and Lymphadenopathy, radiation at that time and was on Tamoxifen for 5 years. In May, this year she was found to relapse of left breast cancer, stage 1 and was started on chemo, one a week, 4 course so far, Last day of chemo was on Monday.     Last colonoscopy was in 2016, with some polyp removed and was recommended to have repeat colonoscopy in 5 year.     She complains of ear pain, diarrhea (improved with medication)  since she is been on chemo  but overall has been tolerating chemo well. REVIEW OF SYSTEMS  Constitutional:   No fever, no fatigue, no pallor, no night sweats, no weight loss.  HEENT:   No eye pain, no vision changes, no icterus, no mouth ulcers.  Respiratory:   No shortness of breath, no cough, no respiratory distress.   Cardiovascular:   No chest pain, no palpitations.   Gastrointestinal: No abdominal pain, no nausea, no vomiting , no diarrhea no constipation, + hematochezia, no melena.  Skin:   No rashes, no jaundice, no eczema.   Musculoskeletal:   No joint pain, no swelling, no myalgia.   Neurologic:   No headache, no seizure, no weakness.   Genitourinary:   No dysuria, no decreased urine output.  Psychiatric:  No depression, no anxiety,   Endocrine:   No thyroid disease, no diabetes.  Heme/Lymphatic:   No anemia, no blood transfusions, no lymph node enlargement, no bleeding, no bruising.  ___________________________________________________________________________________________  Allergies    No Known Allergies    Intolerances      MEDICATIONS  (STANDING):  lisinopril 20 milliGRAM(s) Oral daily  pantoprazole    Tablet 40 milliGRAM(s) Oral before breakfast  sodium chloride 0.9%. 1000 milliLiter(s) (75 mL/Hr) IV Continuous <Continuous>    MEDICATIONS  (PRN):      PAST MEDICAL & SURGICAL HISTORY:  HTN (hypertension)  Breast cancer: left,  History of cholecystectomy  H/O lumpectomy: left breast lumpectomy with lymphadenectomy    FAMILY HISTORY:  FH: uterine cancer: mother    Social History: No hsitory of : Tobacco use, IVDA, EToH  ______________________________________________________________________________________    PHYSICAL EXAM    Daily     Daily   BMI: 26.4 (07-26 @ 12:27)  Change in Weight:  Vital Signs Last 24 Hrs  T(C): 36.8 (28 Jul 2019 05:21), Max: 36.8 (28 Jul 2019 05:21)  T(F): 98.3 (28 Jul 2019 05:21), Max: 98.3 (28 Jul 2019 05:21)  HR: 65 (28 Jul 2019 05:21) (65 - 78)  BP: 115/56 (28 Jul 2019 05:21) (115/56 - 117/78)  BP(mean): --  RR: 16 (28 Jul 2019 05:21) (16 - 17)  SpO2: 97% (28 Jul 2019 05:21) (97% - 98%)    General:  Well developed, well nourished, alert and active, no pallor, NAD.  HEENT:    Normal appearance of conjunctiva, ears, nose, lips, oropharynx, and oral mucosa, anicteric.  Neck:  No masses, no asymmetry.  Lymph Nodes:  No lymphadenopathy.   Cardiovascular:  RRR normal S1/S2, no murmur.  Respiratory:  CTA B/L, normal respiratory effort.   Abdominal:   soft, no masses or tenderness, normoactive BS, NT/ND, no HSM.  Extremities:   No clubbing or cyanosis, normal capillary refill, no edema.   Skin:   No rash, jaundice, lesions, eczema.   Musculoskeletal:  No joint swelling, erythema or tenderness.   Neuro: No focal deficits.   Other:   _______________________________________________________________________________________________  Lab Results:                          12.0   4.92  )-----------( 303      ( 28 Jul 2019 07:52 )             36.2     07-27    141  |  111<H>  |  11  ----------------------------<  85  3.8   |  26  |  0.71    Ca    8.0<L>      27 Jul 2019 07:10  Phos  3.3     07-27  Mg     2.3     07-27    TPro  6.1  /  Alb  3.0<L>  /  TBili  0.6  /  DBili  x   /  AST  15  /  ALT  24  /  AlkPhos  113  07-27    LIVER FUNCTIONS - ( 27 Jul 2019 07:10 )  Alb: 3.0 g/dL / Pro: 6.1 g/dL / ALK PHOS: 113 U/L / ALT: 24 U/L DA / AST: 15 U/L / GGT: x           PT/INR - ( 26 Jul 2019 16:48 )   PT: 12.4 sec;   INR: 1.11 ratio         PTT - ( 26 Jul 2019 16:48 )  PTT:30.9 sec        Stool Results:          RADIOLOGY RESULTS:    SURGICAL PATHOLOGY:

## 2019-07-27 NOTE — CONSULT NOTE ADULT - ASSESSMENT
69 year old lady has bewen on chemo for breast cancer presented with rectal bleeding and some abd pain.
Ms. Crawley is a pleasant 69 year old female currently on active chemo for recurrence of her breast CA.  HEr hemoglobin is stable and her hematochezia has resolved She is up to date on her screenings We discussed it extensively and given her active chemo cycle we will hold on endoscopic intervention at this time. She is agreeable to a CT scan which I will ask the medical team to reattempt today. Please monitor CBC. Ok to advance to low fiber diet Sunday  Advanced care planning was discussed with patient and family.  Advanced care planning forms were reviewed and discussed.  Risks, benefits and alternatives of gastroenterologic procedures were discussed in detail and all questions were answered.

## 2019-07-27 NOTE — PROGRESS NOTE ADULT - ATTENDING COMMENTS
F/u CT A/P. Plan of care discussed with patient, at bedside, agrees, all questions answered.   Ankita Rodriguez MD

## 2019-07-27 NOTE — PROGRESS NOTE ADULT - PROBLEM SELECTOR PLAN 1
-blood tinged stool from last 2 days  - MA Exam: no blood , FOBT: neg   -H/H stable  - will monitor Cbc q12   - full liquid diet for now   - IV hydration gentle   - Dr Lopez consulted   - f/u CT abdomen  -GI follow up for ?plan for colonoscopy/EGD

## 2019-07-28 ENCOUNTER — TRANSCRIPTION ENCOUNTER (OUTPATIENT)
Age: 59
End: 2019-07-28

## 2019-07-28 DIAGNOSIS — E55.9 VITAMIN D DEFICIENCY, UNSPECIFIED: ICD-10-CM

## 2019-07-28 LAB
ALBUMIN SERPL ELPH-MCNC: 3.2 G/DL — LOW (ref 3.5–5)
ALP SERPL-CCNC: 127 U/L — HIGH (ref 40–120)
ALT FLD-CCNC: 27 U/L DA — SIGNIFICANT CHANGE UP (ref 10–60)
ANION GAP SERPL CALC-SCNC: 6 MMOL/L — SIGNIFICANT CHANGE UP (ref 5–17)
AST SERPL-CCNC: 17 U/L — SIGNIFICANT CHANGE UP (ref 10–40)
BASOPHILS # BLD AUTO: 0.03 K/UL — SIGNIFICANT CHANGE UP (ref 0–0.2)
BASOPHILS NFR BLD AUTO: 0.6 % — SIGNIFICANT CHANGE UP (ref 0–2)
BILIRUB SERPL-MCNC: 0.5 MG/DL — SIGNIFICANT CHANGE UP (ref 0.2–1.2)
BUN SERPL-MCNC: 8 MG/DL — SIGNIFICANT CHANGE UP (ref 7–18)
CALCIUM SERPL-MCNC: 8.9 MG/DL — SIGNIFICANT CHANGE UP (ref 8.4–10.5)
CHLORIDE SERPL-SCNC: 111 MMOL/L — HIGH (ref 96–108)
CO2 SERPL-SCNC: 24 MMOL/L — SIGNIFICANT CHANGE UP (ref 22–31)
CREAT SERPL-MCNC: 0.75 MG/DL — SIGNIFICANT CHANGE UP (ref 0.5–1.3)
EOSINOPHIL # BLD AUTO: 0.06 K/UL — SIGNIFICANT CHANGE UP (ref 0–0.5)
EOSINOPHIL NFR BLD AUTO: 1.2 % — SIGNIFICANT CHANGE UP (ref 0–6)
GLUCOSE SERPL-MCNC: 92 MG/DL — SIGNIFICANT CHANGE UP (ref 70–99)
HCT VFR BLD CALC: 36.2 % — SIGNIFICANT CHANGE UP (ref 34.5–45)
HGB BLD-MCNC: 12 G/DL — SIGNIFICANT CHANGE UP (ref 11.5–15.5)
IMM GRANULOCYTES NFR BLD AUTO: 0.6 % — SIGNIFICANT CHANGE UP (ref 0–1.5)
LYMPHOCYTES # BLD AUTO: 1.64 K/UL — SIGNIFICANT CHANGE UP (ref 1–3.3)
LYMPHOCYTES # BLD AUTO: 33.3 % — SIGNIFICANT CHANGE UP (ref 13–44)
MAGNESIUM SERPL-MCNC: 2.4 MG/DL — SIGNIFICANT CHANGE UP (ref 1.6–2.6)
MCHC RBC-ENTMCNC: 29.4 PG — SIGNIFICANT CHANGE UP (ref 27–34)
MCHC RBC-ENTMCNC: 33.1 GM/DL — SIGNIFICANT CHANGE UP (ref 32–36)
MCV RBC AUTO: 88.7 FL — SIGNIFICANT CHANGE UP (ref 80–100)
MONOCYTES # BLD AUTO: 0.23 K/UL — SIGNIFICANT CHANGE UP (ref 0–0.9)
MONOCYTES NFR BLD AUTO: 4.7 % — SIGNIFICANT CHANGE UP (ref 2–14)
NEUTROPHILS # BLD AUTO: 2.93 K/UL — SIGNIFICANT CHANGE UP (ref 1.8–7.4)
NEUTROPHILS NFR BLD AUTO: 59.6 % — SIGNIFICANT CHANGE UP (ref 43–77)
NRBC # BLD: 0 /100 WBCS — SIGNIFICANT CHANGE UP (ref 0–0)
PHOSPHATE SERPL-MCNC: 3.5 MG/DL — SIGNIFICANT CHANGE UP (ref 2.5–4.5)
PLATELET # BLD AUTO: 303 K/UL — SIGNIFICANT CHANGE UP (ref 150–400)
POTASSIUM SERPL-MCNC: 3.7 MMOL/L — SIGNIFICANT CHANGE UP (ref 3.5–5.3)
POTASSIUM SERPL-SCNC: 3.7 MMOL/L — SIGNIFICANT CHANGE UP (ref 3.5–5.3)
PROT SERPL-MCNC: 6.6 G/DL — SIGNIFICANT CHANGE UP (ref 6–8.3)
RBC # BLD: 4.08 M/UL — SIGNIFICANT CHANGE UP (ref 3.8–5.2)
RBC # FLD: 14 % — SIGNIFICANT CHANGE UP (ref 10.3–14.5)
SODIUM SERPL-SCNC: 141 MMOL/L — SIGNIFICANT CHANGE UP (ref 135–145)
WBC # BLD: 4.92 K/UL — SIGNIFICANT CHANGE UP (ref 3.8–10.5)
WBC # FLD AUTO: 4.92 K/UL — SIGNIFICANT CHANGE UP (ref 3.8–10.5)

## 2019-07-28 RX ORDER — ERGOCALCIFEROL 1.25 MG/1
50000 CAPSULE ORAL
Refills: 0 | Status: DISCONTINUED | OUTPATIENT
Start: 2019-07-28 | End: 2019-07-29

## 2019-07-28 RX ADMIN — PANTOPRAZOLE SODIUM 40 MILLIGRAM(S): 20 TABLET, DELAYED RELEASE ORAL at 06:10

## 2019-07-28 RX ADMIN — LISINOPRIL 20 MILLIGRAM(S): 2.5 TABLET ORAL at 06:11

## 2019-07-28 NOTE — CHART NOTE - NSCHARTNOTEFT_GEN_A_CORE
EVENT: patient reported multiple loose stools in last few days.    OBJECTIVE:  Vital Signs Last 24 Hrs  T(C): 36.5 (28 Jul 2019 21:25), Max: 36.8 (28 Jul 2019 05:21)  T(F): 97.7 (28 Jul 2019 21:25), Max: 98.3 (28 Jul 2019 05:21)  HR: 74 (28 Jul 2019 21:25) (63 - 74)  BP: 135/87 (28 Jul 2019 21:25) (101/51 - 135/87)  BP(mean): --  RR: 18 (28 Jul 2019 21:25) (16 - 18)  SpO2: 98% (28 Jul 2019 21:25) (97% - 98%)    FOCUSED PHYSICAL EXAM:  Neuro: awake, alert, oriented x 3. No neuro deficit  Cardiovascular: Pulses +2 B/L in lower and upper extremities, HR regular, BP stable, No edema.  Respiratory: Respirations regular, unlabored, breath sounds clear B/L.   GI: Abdomen soft, non-tender, positive bowel sounds.  : no bladder distention noted. No complaints at this time.  Skin: Dry, intact, no bruising, no diaphoresis.    I&O's      LABS:                        12.0   4.92  )-----------( 303      ( 28 Jul 2019 07:52 )             36.2     07-28    141  |  111<H>  |  8   ----------------------------<  92  3.7   |  24  |  0.75    Ca    8.9      28 Jul 2019 07:52  Phos  3.5     07-28  Mg     2.4     07-28    TPro  6.6  /  Alb  3.2<L>  /  TBili  0.5  /  DBili  x   /  AST  17  /  ALT  27  /  AlkPhos  127<H>  07-28        MICROBIOLOGY:        EKG:   IMGAGING:    ASSESSMENT:  HPI:  70 yo female with PMH of left breast Cancer (Dx in 2000, s/p Lumpectomy/Lymphadenopathy with left breast Cancer relapse in May on chemo presently), HTN sent from blood in stool and  blood tinged saliva. Patient noticed her "stool covered in blood and blood in Toilet bowl" from last 2 days. This morning she also noticed black stool with increased in amount of blood. She also has Lower abdominal pain, 8/10 in severity while passing her bowel from last 3 weeks. She also noticed her saliva being blood tinged but denies any cough from last 3 days. She went to see Dr Graff in OP setting for these complains and was referred to ED. In ED, her rectal EXAM (performed by ED doc) was negative for any bleeding and fecal occult was negative as well.   Patient was first diagnosed with left breast cancer in 2000, had lumpectomy and Lymphadenopathy, radiation at that time and was on Tamoxifen for 5 years. In May, this year she was found to relapse of left breast cancer, stage 1 and was started on chemo, one a week, 4 course so far, Last day of chemo was on Monday.     She also complains of epigastric pain from last few months. 8/10 in severity, radiating to the throat, worse empty stomach, relieved with meals, present 3-4 times a week but has been more frequent lately. She has history of peptic ulcer 20 years back, treated with Protonix, denies history of GI bleed. She had EGD in April and was normal with some gastritis as per the patient. Her last colonoscopy was in 2016, with some polyp removed and was recommended to have repeat colonoscopy in 5 year.     She complains of ear pain, diarrhea (improved with medication)  since she is been on chemo  but overall has been tolerating chemo well. (26 Jul 2019 17:29)      PLAN:   1. c-dif specimen sent  2. Contact precautions    FOLLOW UP / RESULT:

## 2019-07-28 NOTE — DISCHARGE NOTE PROVIDER - CARE PROVIDER_API CALL
Sanjay Hernandez (DO)  Family Medicine  [address] [place] [address]  Phone: [phone]  Fax: [phone]  Follow Up Time: 1 week    Rodney Lewis)  Gastroenterology; Internal Medicine  [address] [place] [address]  Phone: [phone]  Fax: [phone]  Follow Up Time: 1 week

## 2019-07-28 NOTE — PROGRESS NOTE ADULT - PROBLEM SELECTOR PLAN 1
-blood tinged stool from last 2 days  - MS Exam: no blood , FOBT: neg   -H/H stable  - will monitor Cbc q12   - full liquid diet for now   - IV hydration gentle   - Dr Lopez consulted   - f/u CT abdomen  -GI follow up as outpatient for colonoscopy/EGD -blood tinged stool from last 2 days, improved  - FOBT: neg   -H/H stable  - will monitor Cbc  - full liquid diet, advanced as tolerated  - IV hydration gentle   - Dr Lopez consulted   - f/u CT abdomen  -GI follow up as outpatient for colonoscopy/EGD

## 2019-07-28 NOTE — DISCHARGE NOTE PROVIDER - NSDCCPCAREPLAN_GEN_ALL_CORE_FT
PRINCIPAL DISCHARGE DIAGNOSIS  Diagnosis: Rectal bleeding  Assessment and Plan of Treatment: There are 2 common types of GI Bleed, Upper GI Bleed and Lower GI Bleed.  Upper GI Bleed affects the esophagus, stomach, and first part of the small intestine. Lower GI Bleed affects the colon and rectum.  Upper GI Bleed signs and symptoms to notify your Health Care Provider are vomiting blood, or coffee ground vomitus, and bowel movements that look like black tar.  Lower GI Bleed signs and symptoms to notify your health care provider are bright red bloody bowel movements.   Take your medications as prescribed by your Gastroenterologist.  If you have had an Endoscopy or Colonoscopy, follow up with your Gastroenterologist for Pathology results.  Avoid NSAIDs unless your Health Care Provider tells you that it is ok (Aspirin, Ibuprofen, Advil, Motrin, Aleve).  Follow up with your Gastroenterologist within 1-2 weeks of discharge.      SECONDARY DISCHARGE DIAGNOSES  Diagnosis: Breast cancer  Assessment and Plan of Treatment: Continue to follow up with your Oncologist as per your appointments. PRINCIPAL DISCHARGE DIAGNOSIS  Diagnosis: Rectal bleeding  Assessment and Plan of Treatment: There are 2 common types of GI Bleed, Upper GI Bleed and Lower GI Bleed.  Upper GI Bleed affects the esophagus, stomach, and first part of the small intestine. Lower GI Bleed affects the colon and rectum.  Upper GI Bleed signs and symptoms to notify your Health Care Provider are vomiting blood, or coffee ground vomitus, and bowel movements that look like black tar.  Lower GI Bleed signs and symptoms to notify your health care provider are bright red bloody bowel movements.   Take your medications as prescribed by your Gastroenterologist.  If you have had an Endoscopy or Colonoscopy, follow up with your Gastroenterologist for Pathology results.  Avoid NSAIDs unless your Health Care Provider tells you that it is ok (Aspirin, Ibuprofen, Advil, Motrin, Aleve).  Follow up with your Gastroenterologist within 1-2 weeks of discharge.      SECONDARY DISCHARGE DIAGNOSES  Diagnosis: Vitamin D deficiency  Assessment and Plan of Treatment: Vitamin D deficiency - Vitamin D supplement given    Diagnosis: HTN (hypertension)  Assessment and Plan of Treatment: HTN (hypertension) - continue home BP medicaton    Diagnosis: Breast cancer  Assessment and Plan of Treatment: Continue to follow up with your Oncologist as per your appointments.

## 2019-07-28 NOTE — DISCHARGE NOTE PROVIDER - CARE PROVIDERS DIRECT ADDRESSES
,[email or web address],[email or web address]

## 2019-07-28 NOTE — PROGRESS NOTE ADULT - SUBJECTIVE AND OBJECTIVE BOX
Summary:   69y  Female  with     Subjective:   Bleeding has stopped     Objective:    MEDICATIONS  (STANDING):  lisinopril 20 milliGRAM(s) Oral daily  pantoprazole    Tablet 40 milliGRAM(s) Oral before breakfast  sodium chloride 0.9%. 1000 milliLiter(s) (75 mL/Hr) IV Continuous <Continuous>    MEDICATIONS  (PRN):              Vital Signs Last 24 Hrs  T(C): 36.8 (2019 05:21), Max: 36.8 (2019 05:21)  T(F): 98.3 (2019 05:21), Max: 98.3 (2019 05:21)  HR: 65 (2019 05:21) (65 - 78)  BP: 115/56 (2019 05:21) (115/56 - 117/78)  BP(mean): --  RR: 16 (2019 05:21) (16 - 17)  SpO2: 97% (2019 05:21) (97% - 98%)      General:  Well developed, well nourished, alert and active, no pallor, NAD.  HEENT:    Normal appearance of conjunctiva, ears, nose, lips, oropharynx, and oral mucosa, anicteric.  Neck:  No masses, no asymmetry.  Lymph Nodes:  No lymphadenopathy.   Cardiovascular:  RRR normal S1/S2, no murmur.  Respiratory:  CTA B/L, normal respiratory effort.   Abdominal:   soft, no masses or tenderness, normoactive BS, NT/ND, no HSM.  Extremities:   No clubbing or cyanosis, normal capillary refill, no edema.   Skin:   No rash, jaundice, lesions, eczema.   Musculoskeletal:  No joint swelling, erythema or tenderness.   Neuro: No focal deficits.   Other:       LABS:                        12.0   4.92  )-----------( 303      ( 2019 07:52 )             36.2     07-    141  |  111<H>  |  8   ----------------------------<  92  3.7   |  24  |  0.75    Ca    8.9      2019 07:52  Phos  3.5     07-  Mg     2.4     -    TPro  6.6  /  Alb  3.2<L>  /  TBili  0.5  /  DBili  x   /  AST  17  /  ALT  27  /  AlkPhos  127<H>  07-28    PT/INR - ( 2019 16:48 )   PT: 12.4 sec;   INR: 1.11 ratio         PTT - ( 2019 16:48 )  PTT:30.9 sec  Urinalysis Basic - ( 2019 03:33 )    Color: Yellow / Appearance: Clear / S.005 / pH: x  Gluc: x / Ketone: Trace  / Bili: Negative / Urobili: Negative   Blood: x / Protein: Negative / Nitrite: Negative   Leuk Esterase: Trace / RBC: 0-2 /HPF / WBC 3-5 /HPF   Sq Epi: x / Non Sq Epi: Few /HPF / Bacteria: Moderate /HPF        RADIOLOGY & ADDITIONAL TESTS:

## 2019-07-28 NOTE — DISCHARGE NOTE PROVIDER - PROVIDER TOKENS
PROVIDER:[[id number]],FOLLOWUP:[1 week]],PROVIDER:[TOKEN:[[id number]],FOLLOWUP:[1 week]]

## 2019-07-28 NOTE — DISCHARGE NOTE PROVIDER - HOSPITAL COURSE
70 yo female with PMH of left breast Cancer (Dx in 2000, s/p Lumpectomy/Lymphadenopathy with left breast Cancer relapse in May on chemo presently), HTN sent from blood in stool and  blood tinged saliva. Patient noticed her "stool covered in blood and blood in Toilet bowl" from last 2 days. This morning she also noticed black stool with increased in amount of blood. She also has Lower abdominal pain, 8/10 in severity while passing her bowel from last 3 weeks. She also noticed her saliva being blood tinged but denies any cough from last 3 days. She went to see Dr Graff in OP setting for these complains and was referred to ED. In ED, her rectal EXAM (performed by ED doc) was negative for any bleeding and fecal occult was negative as well.     Patient was first diagnosed with left breast cancer in 2000, had lumpectomy and Lymphadenopathy, radiation at that time and was on Tamoxifen for 5 years. In May, this year she was found to relapse of left breast cancer, stage 1 and was started on chemo, one a week, 4 course so far, Last day of chemo was on Monday.         She also complains of epigastric pain from last few months. 8/10 in severity, radiating to the throat, worse empty stomach, relieved with meals, present 3-4 times a week but has been more frequent lately. She has history of peptic ulcer 20 years back, treated with Protonix, denies history of GI bleed. She had EGD in April and was normal with some gastritis as per the patient. Her last colonoscopy was in 2016, with some polyp removed and was recommended to have repeat colonoscopy in 5 year.         She complains of ear pain, diarrhea (improved with medication)  since she is been on chemo  but overall has been tolerating chemo well.         7/28 - H/H stable.  Bleeding has stopped.  Dr. Lopez (GI) to do colonoscopy as outpatient as patient is now stable.        NOT COMPLETE 70 yo female with PMH of left breast Cancer (Dx in 2000, s/p Lumpectomy/Lymphadenopathy with left breast Cancer relapse in May on chemo presently), HTN sent from blood in stool and  blood tinged saliva. Patient noticed her "stool covered in blood and blood in Toilet bowl" from last 2 days. This morning she also noticed black stool with increased in amount of blood. She also has Lower abdominal pain, 8/10 in severity while passing her bowel from last 3 weeks. She also noticed her saliva being blood tinged but denies any cough from last 3 days. She went to see Dr Graff in OP setting for these complains and was referred to ED. In ED, her rectal EXAM (performed by ED doc) was negative for any bleeding and fecal occult was negative as well.     Patient was first diagnosed with left breast cancer in 2000, had lumpectomy and Lymphadenopathy, radiation at that time and was on Tamoxifen for 5 years. In May, this year she was found to relapse of left breast cancer, stage 1 and was started on chemo, one a week, 4 course so far, Last day of chemo was on Monday.         She also complains of epigastric pain from last few months. 8/10 in severity, radiating to the throat, worse empty stomach, relieved with meals, present 3-4 times a week but has been more frequent lately. She has history of peptic ulcer 20 years back, treated with Protonix, denies history of GI bleed. She had EGD in April and was normal with some gastritis as per the patient. Her last colonoscopy was in 2016, with some polyp removed and was recommended to have repeat colonoscopy in 5 year.         She complains of ear pain, diarrhea (improved with medication)  since she is been on chemo  but overall has been tolerating chemo well.         7/28 - H/H stable.  Bleeding has stopped.  Dr. Lopez (GI) to do colonoscopy as outpatient as patient is now stable.        7/29 - Patient complained of diarrhea overnight.  C. diff sample sent.  Pt denies any stools today, WBC stable and afebrile.  CT A/P with oral & IV contrast indicates no evidence of acute intra-abdominal pathology.        Patient medically stable for discharge. 69 year old female with PMH of left breast cancer (Dx in 2000, s/p Lumpectomy/Lymphadenopathy with left breast Cancer relapse in May on chemo presently), HTN sent from blood in stool and  blood tinged saliva. Patient noticed her "stool covered in blood and blood in Toilet bowl" from last 2 days prior to admission. This morning she also noticed black stool with increased in amount of blood. She also has Lower abdominal pain, 8/10 in severity while passing her bowel from last 3 weeks. She also noticed her saliva being blood tinged but denies any cough from last 3 days. She went to see Dr Graff in OP setting for these complains and was referred to ED. In ED, her rectal EXAM (performed by ED doc) was negative for any bleeding and fecal occult was negative as well. Patient was first diagnosed with left breast cancer in 2000, had lumpectomy and Lymphadenopathy, radiation at that time and was on Tamoxifen for 5 years. In May, this year she was found to relapse of left breast cancer, stage 1 and was started on chemo, one a week, 4 course so far, Last day of chemo was on Monday.         She also complains of epigastric pain from last few months. 8/10 in severity, radiating to the throat, worse empty stomach, relieved with meals, present 3-4 times a week but has been more frequent lately. She has history of peptic ulcer 20 years back, treated with Protonix, denies history of GI bleed. She had EGD in April and was normal with some gastritis as per the patient. Her last colonoscopy was in 2016, with some polyp removed and was recommended to have repeat colonoscopy in 5 year.         She complains of ear pain, diarrhea (improved with medication)  since she is been on chemo  but overall has been tolerating chemo well.         7/28 - H/H stable.  Bleeding has stopped.  Dr. Lopez (GI) recommended to do colonoscopy as outpatient as patient.        7/29 - Patient complained of diarrhea overnight. Pt denies any stools today, WBC stable and afebrile.  CT A/P with oral & IV contrast indicates no evidence of acute intra-abdominal pathology.        Patient medically stable for discharge.

## 2019-07-28 NOTE — PROGRESS NOTE ADULT - PROBLEM SELECTOR PLAN 6
IMPROVE VTE Individual Risk Assessment  RISK                                                                Points  [  ] Previous VTE                                                  3  [  ] Thrombophilia                                               2  [  ] Lower limb paralysis                                      2        (unable to hold up >15 seconds)    [x  ] Current Cancer                                              2         (within 6 months)  [x  ] Immobilization > 24 hrs                                1  [  ] ICU/CCU stay > 24 hours                              1  [x  ] Age > 60                                                      1  IMPROVE VTE Score _____4, but DVT proph held in setting of GI bleed
started Vit D supplement

## 2019-07-28 NOTE — PROGRESS NOTE ADULT - ATTENDING COMMENTS
F/u CT A/P. Plan of care discussed with patient, at bedside, agrees, all questions answered.   Ankita Rodriguez MD F/u CT A/P. Plan of care discussed with patient, and daughter at bedside, agrees, all questions answered.   Ankita Rodriguez MD

## 2019-07-28 NOTE — PROGRESS NOTE ADULT - SUBJECTIVE AND OBJECTIVE BOX
Patient is a 69y old  Female who presents with a chief complaint of blood in stool and blood tinged saliva (2019 17:20)        MEDICATIONS  (STANDING):  lisinopril 20 milliGRAM(s) Oral daily  pantoprazole    Tablet 40 milliGRAM(s) Oral before breakfast  sodium chloride 0.9%. 1000 milliLiter(s) (75 mL/Hr) IV Continuous <Continuous>    MEDICATIONS  (PRN):      REVIEW OF SYSTEMS:  CONSTITUTIONAL: No fever, weight loss, or fatigue  EYES: No eye pain, visual disturbances, or discharge  ENMT:  No difficulty hearing, tinnitus, vertigo; No sinus or throat pain  NECK: No pain or stiffness  RESPIRATORY: No cough, wheezing, chills or hemoptysis; No shortness of breath  CARDIOVASCULAR: No chest pain, palpitations, dizziness, or leg swelling  GASTROINTESTINAL: No abdominal or epigastric pain. No nausea, vomiting, or hematemesis; No diarrhea or constipation. No melena or hematochezia.  GENITOURINARY: No dysuria, frequency, hematuria, or incontinence  NEUROLOGICAL: No headaches, memory loss, loss of strength, numbness, or tremors  SKIN: No itching, burning, rashes, or lesions   LYMPH NODES: No enlarged glands  ENDOCRINE: No heat or cold intolerance; No hair loss  MUSCULOSKELETAL: No joint pain or swelling; No muscle, back, or extremity pain  PSYCHIATRIC: No depression, anxiety, mood swings, or difficulty sleeping  HEME/LYMPH: No easy bruising, or bleeding gums  ALLERY AND IMMUNOLOGIC: No hives or eczema    PHYSICAL EXAM:    T(C): 36.7 (19 @ 14:38), Max: 36.8 (19 @ 05:21)  HR: 63 (19 @ 14:38) (63 - 65)  BP: 101/51 (19 @ 14:38) (101/51 - 115/56)  RR: 17 (19 @ 14:38) (16 - 17)  SpO2: 98% (19 @ 14:38) (97% - 98%)  Wt(kg): --  I&O's Summary      GENERAL: NAD, well-groomed, well-developed  HEAD:  Atraumatic, Normocephalic  EYES: EOMI, PERRL, conjunctiva and sclera clear  ENMT: No tonsillar erythema, exudates, or enlargement; Moist mucous membranes, Good dentition, No lesions  NECK: Supple, No JVD, Normal thyroid  NERVOUS SYSTEM:  Alert & Oriented X3, Good concentration; Motor Strength 5/5 B/L upper and lower extremities; DTRs 2+ intact and symmetric  CHEST/LUNG: Clear to ascultation bilaterally; No rales, rhonchi, wheezing, or rubs  HEART: Regular rate and rhythm; No murmurs, rubs, or gallops  ABDOMEN: Soft, Nontender, Nondistended; Bowel sounds present  EXTREMITIES:  2+ Peripheral Pulses, No clubbing, cyanosis, or edema  LYMPH: No lymphadenopathy noted  SKIN: No rashes or lesions    LABS:                        12.0   4.92  )-----------( 303      ( 2019 07:52 )             36.2         141  |  111<H>  |  8   ----------------------------<  92  3.7   |  24  |  0.75    Ca    8.9      2019 07:52  Phos  3.5       Mg     2.4         TPro  6.6  /  Alb  3.2<L>  /  TBili  0.5  /  DBili  x   /  AST  17  /  ALT  27  /  AlkPhos  127<H>        Urinalysis Basic - ( 2019 03:33 )    Color: Yellow / Appearance: Clear / S.005 / pH: x  Gluc: x / Ketone: Trace  / Bili: Negative / Urobili: Negative   Blood: x / Protein: Negative / Nitrite: Negative   Leuk Esterase: Trace / RBC: 0-2 /HPF / WBC 3-5 /HPF   Sq Epi: x / Non Sq Epi: Few /HPF / Bacteria: Moderate /HPF      CAPILLARY BLOOD GLUCOSE                RADIOLOGY & ADDITIONAL TESTS:    Imaging Personally Reviewed:  [x] YES  [ ] NO    Consultant(s) Notes Reviewed:  [x] YES  [ ] NO    Care Discussed with Consultants/Other Providers [x] YES  [ ] NO Patient is a 69 year old  female who presents with a chief complaint of blood in stool and blood tinged saliva (2019 17:20)    Patient seen and examined reports feels better today, denies blood in stool or saliva today        MEDICATIONS  (STANDING):  lisinopril 20 milliGRAM(s) Oral daily  pantoprazole    Tablet 40 milliGRAM(s) Oral before breakfast  sodium chloride 0.9%. 1000 milliLiter(s) (75 mL/Hr) IV Continuous <Continuous>    REVIEW OF SYSTEMS:  CONSTITUTIONAL: No fever, weight loss, mild fatigue  EYES: No eye pain, visual disturbances, or discharge  ENMT:  No difficulty hearing, tinnitus, vertigo; No sinus or throat pain  NECK: No pain or stiffness  RESPIRATORY: No cough, wheezing, chills or hemoptysis; No shortness of breath  CARDIOVASCULAR: No chest pain, palpitations, dizziness, or leg swelling  GASTROINTESTINAL: No abdominal or epigastric pain. No nausea, vomiting, or hematemesis; No diarrhea or constipation. No melena or hematochezia.  GENITOURINARY: No dysuria, frequency, hematuria, or incontinence  NEUROLOGICAL: No headaches, memory loss, loss of strength, numbness, or tremors  SKIN: No itching, burning, rashes, or lesions   ENDOCRINE: No heat or cold intolerance; No hair loss  MUSCULOSKELETAL: No joint pain or swelling; No muscle, back, or extremity pain  PSYCHIATRIC: No depression, anxiety, mood swings, or difficulty sleeping  HEME/LYMPH: No easy bruising, or bleeding gums  ALLERGY AND IMMUNOLOGIC: No hives or eczema        PHYSICAL EXAM:    T(C): 36.7 (19 @ 14:38), Max: 36.8 (19 @ 05:21)  HR: 63 (19 @ 14:38) (63 - 65)  BP: 101/51 (19 @ 14:38) (101/51 - 115/56)  RR: 17 (19 @ 14:38) (16 - 17)  SpO2: 98% (19 @ 14:38) (97% - 98%)      GENERAL: NAD, well-groomed, well-developed  HEAD:  Atraumatic, Normocephalic  EYES: EOMI, PERRL, conjunctiva and sclera clear  ENMT: No tonsillar erythema, exudates, or enlargement; Moist mucous membranes, Good dentition, No lesions  NECK: Supple, No JVD, Normal thyroid  NERVOUS SYSTEM:  Alert & Oriented X3, Good concentration; Motor Strength 5/5 B/L upper and lower extremities; DTRs 2+ intact and symmetric  CHEST/LUNG: Clear to ascultation bilaterally; No rales, rhonchi, wheezing, or rubs  HEART: Regular rate and rhythm; No murmurs, rubs, or gallops  ABDOMEN: Soft, Nontender, Nondistended; Bowel sounds present  EXTREMITIES:  2+ Peripheral Pulses, No clubbing, cyanosis, or edema  SKIN: No rash      LABS:                        12.0   4.92  )-----------( 303      ( 2019 07:52 )             36.2     -    141  |  111<H>  |  8   ----------------------------<  92  3.7   |  24  |  0.75    Ca    8.9      2019 07:52  Phos  3.5       Mg     2.4         TPro  6.6  /  Alb  3.2<L>  /  TBili  0.5  /  DBili  x   /  AST  17  /  ALT  27  /  AlkPhos  127<H>        Urinalysis Basic - ( 2019 03:33 )    Color: Yellow / Appearance: Clear / S.005 / pH: x  Gluc: x / Ketone: Trace  / Bili: Negative / Urobili: Negative   Blood: x / Protein: Negative / Nitrite: Negative   Leuk Esterase: Trace / RBC: 0-2 /HPF / WBC 3-5 /HPF   Sq Epi: x / Non Sq Epi: Few /HPF / Bacteria: Moderate /HPF      CAPILLARY BLOOD GLUCOSE                RADIOLOGY & ADDITIONAL TESTS:    Imaging Personally Reviewed:  [x] YES  [ ] NO    Consultant(s) Notes Reviewed:  [x] YES  [ ] NO    Care Discussed with Consultants/Other Providers [x] YES  [ ] NO

## 2019-07-29 ENCOUNTER — TRANSCRIPTION ENCOUNTER (OUTPATIENT)
Age: 59
End: 2019-07-29

## 2019-07-29 VITALS
OXYGEN SATURATION: 99 % | TEMPERATURE: 98 F | HEART RATE: 54 BPM | DIASTOLIC BLOOD PRESSURE: 54 MMHG | SYSTOLIC BLOOD PRESSURE: 111 MMHG | RESPIRATION RATE: 17 BRPM

## 2019-07-29 DIAGNOSIS — R19.7 DIARRHEA, UNSPECIFIED: ICD-10-CM

## 2019-07-29 DIAGNOSIS — K62.5 HEMORRHAGE OF ANUS AND RECTUM: ICD-10-CM

## 2019-07-29 LAB
ALBUMIN SERPL ELPH-MCNC: 3.1 G/DL — LOW (ref 3.5–5)
ALP SERPL-CCNC: 137 U/L — HIGH (ref 40–120)
ALT FLD-CCNC: 25 U/L DA — SIGNIFICANT CHANGE UP (ref 10–60)
ANION GAP SERPL CALC-SCNC: 7 MMOL/L — SIGNIFICANT CHANGE UP (ref 5–17)
AST SERPL-CCNC: 21 U/L — SIGNIFICANT CHANGE UP (ref 10–40)
BASOPHILS # BLD AUTO: 0.03 K/UL — SIGNIFICANT CHANGE UP (ref 0–0.2)
BASOPHILS NFR BLD AUTO: 0.6 % — SIGNIFICANT CHANGE UP (ref 0–2)
BILIRUB SERPL-MCNC: 0.4 MG/DL — SIGNIFICANT CHANGE UP (ref 0.2–1.2)
BUN SERPL-MCNC: 11 MG/DL — SIGNIFICANT CHANGE UP (ref 7–18)
C DIFF BY PCR RESULT: SIGNIFICANT CHANGE UP
C DIFF TOX GENS STL QL NAA+PROBE: SIGNIFICANT CHANGE UP
CALCIUM SERPL-MCNC: 8.5 MG/DL — SIGNIFICANT CHANGE UP (ref 8.4–10.5)
CHLORIDE SERPL-SCNC: 109 MMOL/L — HIGH (ref 96–108)
CO2 SERPL-SCNC: 23 MMOL/L — SIGNIFICANT CHANGE UP (ref 22–31)
CREAT SERPL-MCNC: 0.81 MG/DL — SIGNIFICANT CHANGE UP (ref 0.5–1.3)
EOSINOPHIL # BLD AUTO: 0.08 K/UL — SIGNIFICANT CHANGE UP (ref 0–0.5)
EOSINOPHIL NFR BLD AUTO: 1.5 % — SIGNIFICANT CHANGE UP (ref 0–6)
GLUCOSE SERPL-MCNC: 82 MG/DL — SIGNIFICANT CHANGE UP (ref 70–99)
HCT VFR BLD CALC: 35.8 % — SIGNIFICANT CHANGE UP (ref 34.5–45)
HGB BLD-MCNC: 11.8 G/DL — SIGNIFICANT CHANGE UP (ref 11.5–15.5)
IMM GRANULOCYTES NFR BLD AUTO: 0.7 % — SIGNIFICANT CHANGE UP (ref 0–1.5)
LYMPHOCYTES # BLD AUTO: 1.71 K/UL — SIGNIFICANT CHANGE UP (ref 1–3.3)
LYMPHOCYTES # BLD AUTO: 31.4 % — SIGNIFICANT CHANGE UP (ref 13–44)
MAGNESIUM SERPL-MCNC: 2.5 MG/DL — SIGNIFICANT CHANGE UP (ref 1.6–2.6)
MCHC RBC-ENTMCNC: 29.6 PG — SIGNIFICANT CHANGE UP (ref 27–34)
MCHC RBC-ENTMCNC: 33 GM/DL — SIGNIFICANT CHANGE UP (ref 32–36)
MCV RBC AUTO: 89.9 FL — SIGNIFICANT CHANGE UP (ref 80–100)
MONOCYTES # BLD AUTO: 0.36 K/UL — SIGNIFICANT CHANGE UP (ref 0–0.9)
MONOCYTES NFR BLD AUTO: 6.6 % — SIGNIFICANT CHANGE UP (ref 2–14)
NEUTROPHILS # BLD AUTO: 3.23 K/UL — SIGNIFICANT CHANGE UP (ref 1.8–7.4)
NEUTROPHILS NFR BLD AUTO: 59.2 % — SIGNIFICANT CHANGE UP (ref 43–77)
NRBC # BLD: 0 /100 WBCS — SIGNIFICANT CHANGE UP (ref 0–0)
PHOSPHATE SERPL-MCNC: 4.1 MG/DL — SIGNIFICANT CHANGE UP (ref 2.5–4.5)
PLATELET # BLD AUTO: 273 K/UL — SIGNIFICANT CHANGE UP (ref 150–400)
POTASSIUM SERPL-MCNC: 3.6 MMOL/L — SIGNIFICANT CHANGE UP (ref 3.5–5.3)
POTASSIUM SERPL-SCNC: 3.6 MMOL/L — SIGNIFICANT CHANGE UP (ref 3.5–5.3)
PROT SERPL-MCNC: 6.6 G/DL — SIGNIFICANT CHANGE UP (ref 6–8.3)
RBC # BLD: 3.98 M/UL — SIGNIFICANT CHANGE UP (ref 3.8–5.2)
RBC # FLD: 13.8 % — SIGNIFICANT CHANGE UP (ref 10.3–14.5)
SODIUM SERPL-SCNC: 139 MMOL/L — SIGNIFICANT CHANGE UP (ref 135–145)
WBC # BLD: 5.45 K/UL — SIGNIFICANT CHANGE UP (ref 3.8–10.5)
WBC # FLD AUTO: 5.45 K/UL — SIGNIFICANT CHANGE UP (ref 3.8–10.5)

## 2019-07-29 PROCEDURE — 82272 OCCULT BLD FECES 1-3 TESTS: CPT

## 2019-07-29 PROCEDURE — 74177 CT ABD & PELVIS W/CONTRAST: CPT

## 2019-07-29 PROCEDURE — 86850 RBC ANTIBODY SCREEN: CPT

## 2019-07-29 PROCEDURE — 83735 ASSAY OF MAGNESIUM: CPT

## 2019-07-29 PROCEDURE — G0378: CPT

## 2019-07-29 PROCEDURE — 74177 CT ABD & PELVIS W/CONTRAST: CPT | Mod: 26

## 2019-07-29 PROCEDURE — 82607 VITAMIN B-12: CPT

## 2019-07-29 PROCEDURE — 93005 ELECTROCARDIOGRAM TRACING: CPT

## 2019-07-29 PROCEDURE — 83036 HEMOGLOBIN GLYCOSYLATED A1C: CPT

## 2019-07-29 PROCEDURE — 82746 ASSAY OF FOLIC ACID SERUM: CPT

## 2019-07-29 PROCEDURE — 85610 PROTHROMBIN TIME: CPT

## 2019-07-29 PROCEDURE — 81001 URINALYSIS AUTO W/SCOPE: CPT

## 2019-07-29 PROCEDURE — 80061 LIPID PANEL: CPT

## 2019-07-29 PROCEDURE — 99285 EMERGENCY DEPT VISIT HI MDM: CPT | Mod: 25

## 2019-07-29 PROCEDURE — 82306 VITAMIN D 25 HYDROXY: CPT

## 2019-07-29 PROCEDURE — 86901 BLOOD TYPING SEROLOGIC RH(D): CPT

## 2019-07-29 PROCEDURE — 85730 THROMBOPLASTIN TIME PARTIAL: CPT

## 2019-07-29 PROCEDURE — 85027 COMPLETE CBC AUTOMATED: CPT

## 2019-07-29 PROCEDURE — 84443 ASSAY THYROID STIM HORMONE: CPT

## 2019-07-29 PROCEDURE — 86900 BLOOD TYPING SEROLOGIC ABO: CPT

## 2019-07-29 PROCEDURE — 86803 HEPATITIS C AB TEST: CPT

## 2019-07-29 PROCEDURE — 80053 COMPREHEN METABOLIC PANEL: CPT

## 2019-07-29 PROCEDURE — 36415 COLL VENOUS BLD VENIPUNCTURE: CPT

## 2019-07-29 PROCEDURE — 87493 C DIFF AMPLIFIED PROBE: CPT

## 2019-07-29 PROCEDURE — 84100 ASSAY OF PHOSPHORUS: CPT

## 2019-07-29 RX ORDER — IOHEXOL 300 MG/ML
30 INJECTION, SOLUTION INTRAVENOUS ONCE
Refills: 0 | Status: COMPLETED | OUTPATIENT
Start: 2019-07-29 | End: 2019-07-29

## 2019-07-29 RX ORDER — ERGOCALCIFEROL 1.25 MG/1
1 CAPSULE ORAL
Qty: 2 | Refills: 0
Start: 2019-07-29 | End: 2019-08-11

## 2019-07-29 RX ORDER — POTASSIUM CHLORIDE 20 MEQ
20 PACKET (EA) ORAL ONCE
Refills: 0 | Status: COMPLETED | OUTPATIENT
Start: 2019-07-29 | End: 2019-07-29

## 2019-07-29 RX ADMIN — ERGOCALCIFEROL 50000 UNIT(S): 1.25 CAPSULE ORAL at 11:45

## 2019-07-29 RX ADMIN — LISINOPRIL 20 MILLIGRAM(S): 2.5 TABLET ORAL at 05:06

## 2019-07-29 RX ADMIN — Medication 20 MILLIEQUIVALENT(S): at 11:44

## 2019-07-29 RX ADMIN — IOHEXOL 30 MILLILITER(S): 300 INJECTION, SOLUTION INTRAVENOUS at 11:44

## 2019-07-29 RX ADMIN — PANTOPRAZOLE SODIUM 40 MILLIGRAM(S): 20 TABLET, DELAYED RELEASE ORAL at 05:06

## 2019-07-29 NOTE — CHART NOTE - NSCHARTNOTEFT_GEN_A_CORE
Patient denies any further episode of diarrhea.  GENERAL: NAD, well-groomed, well-developed  HEAD:  Atraumatic, Normocephalic  EYES: EOMI, PERRL, conjunctiva and sclera clear  ENMT: No tonsillar erythema, exudates, or enlargement; Moist mucous membranes, Good dentition, No lesions  NECK: Supple, No JVD, Normal thyroid  NERVOUS SYSTEM:  Alert & Oriented X3, Good concentration; Motor Strength 5/5 B/L upper and lower extremities; DTRs 2+ intact and symmetric  CHEST/LUNG: Clear to ascultation bilaterally; No rales, rhonchi, wheezing, or rubs  HEART: Regular rate and rhythm; No murmurs, rubs, or gallops  ABDOMEN: Soft, Nontender, Nondistended; Bowel sounds present  EXTREMITIES:  2+ Peripheral Pulses, No clubbing, cyanosis, or edema  SKIN: No rash  CT A/P negative   Patient is medically stable for discharge. See discharge note for details.  Ankita Rodriguez MD

## 2019-07-29 NOTE — PROGRESS NOTE ADULT - PROBLEM SELECTOR PLAN 3
c/w chemo with her oncologist   -Dr Graff on case
happened over 1 night.  it stopped now  ?due to chemo
c/w chemo with her oncologist   -Dr Graff on case

## 2019-07-29 NOTE — DISCHARGE NOTE NURSING/CASE MANAGEMENT/SOCIAL WORK - NSDCDPATPORTLINK_GEN_ALL_CORE
You can access the [facility] Patient Portal, offered by [facility], by registering with the following website: [email or web address]

## 2019-07-29 NOTE — PROGRESS NOTE ADULT - REASON FOR ADMISSION
blood in stool and blood tinged saliva

## 2019-07-29 NOTE — PROGRESS NOTE ADULT - PROBLEM SELECTOR PLAN 2
-Epigastric pain, radiating to throat,   - EGD in April : gastritis   -c/w protonix   Dr Lopez consulted
on herceptin, perjeta, and taxol
-Epigastric pain, radiating to throat,   - EGD in April : gastritis   -c/w protonix   Dr Lopez consulted

## 2019-07-29 NOTE — PROGRESS NOTE ADULT - SUBJECTIVE AND OBJECTIVE BOX
no bleeding but had many times watery diarrhea last night  stopped thia am  no fever, pain, sob    MEDICATIONS  (STANDING):  ergocalciferol 42133 Unit(s) Oral <User Schedule>  lisinopril 20 milliGRAM(s) Oral daily  pantoprazole    Tablet 40 milliGRAM(s) Oral before breakfast  sodium chloride 0.9%. 1000 milliLiter(s) (75 mL/Hr) IV Continuous <Continuous>    MEDICATIONS  (PRN):      Allergies    No Known Allergies    Intolerances        Vital Signs Last 24 Hrs  T(C): 36.4 (29 Jul 2019 14:41), Max: 37 (29 Jul 2019 05:12)  T(F): 97.5 (29 Jul 2019 14:41), Max: 98.6 (29 Jul 2019 05:12)  HR: 54 (29 Jul 2019 14:41) (54 - 61)  BP: 111/54 (29 Jul 2019 14:41) (106/53 - 111/54)  BP(mean): --  RR: 17 (29 Jul 2019 14:41) (17 - 17)  SpO2: 99% (29 Jul 2019 14:41) (99% - 99%)    PHYSICAL EXAM  General: adult in NAD  HEENT: clear oropharynx, anicteric sclera, pink conjunctiva  Neck: supple  CV: normal S1/S2 with no murmur rubs or gallops  Lungs: positive air movement b/l ant lungs,clear to auscultation, no wheezes, no rales  Abdomen: soft non-tender non-distended, no hepatosplenomegaly  Ext: no clubbing cyanosis or edema  Skin: no rashes and no petechiae  Neuro: alert and oriented X 4, no focal deficits  LABS:                          11.8   5.45  )-----------( 273      ( 29 Jul 2019 07:43 )             35.8         Mean Cell Volume : 89.9 fl  Mean Cell Hemoglobin : 29.6 pg  Mean Cell Hemoglobin Concentration : 33.0 gm/dL  Auto Neutrophil # : 3.23 K/uL  Auto Lymphocyte # : 1.71 K/uL  Auto Monocyte # : 0.36 K/uL  Auto Eosinophil # : 0.08 K/uL  Auto Basophil # : 0.03 K/uL  Auto Neutrophil % : 59.2 %  Auto Lymphocyte % : 31.4 %  Auto Monocyte % : 6.6 %  Auto Eosinophil % : 1.5 %  Auto Basophil % : 0.6 %    Serial CBC  Hematocrit 35.8  Hemoglobin 11.8  Plat 273  RBC 3.98  WBC 5.45  Serial CBC  Hematocrit 36.2  Hemoglobin 12.0  Plat 303  RBC 4.08  WBC 4.92  Serial CBC  Hematocrit 35.1  Hemoglobin 11.7  Plat 286  RBC 3.95  WBC 6.36  Serial CBC  Hematocrit 34.2  Hemoglobin 11.2  Plat 273  RBC 3.87  WBC 5.20  Serial CBC  Hematocrit 36.6  Hemoglobin 12.0  Plat 298  RBC 4.13  WBC 5.46  Serial CBC  Hematocrit 37.6  Hemoglobin 12.6  Plat 319  RBC 4.30  WBC 6.35    07-29    139  |  109<H>  |  11  ----------------------------<  82  3.6   |  23  |  0.81    Ca    8.5      29 Jul 2019 07:43  Phos  4.1     07-29  Mg     2.5     07-29    TPro  6.6  /  Alb  3.1<L>  /  TBili  0.4  /  DBili  x   /  AST  21  /  ALT  25  /  AlkPhos  137<H>  07-29          Vitamin B12, Serum: 535 pg/mL (07-27 @ 09:36)  Folate, Serum: >20.0 ng/mL (07-27 @ 09:36)            BLOOD SMEAR INTERPRETATION:       RADIOLOGY & ADDITIONAL STUDIES:

## 2019-07-29 NOTE — PROGRESS NOTE ADULT - ASSESSMENT
Discussed with patient. She has an outpatient appointment wiht her GI this week. She would like ot hold off on colonoscopy while in hospital.   H/H is stable. Discharge planning as per rpiamry team
Discussed with patient. She has an outpatient appointment wiht her GI this week. She would like ot hold off on colonoscopy while in hospital.   H/H is stable. Discharge planning as per rpiamry team
68 yo female with PMH of left breast Cancer (Dx in 2000, s/p Lumpectomy/Lymphadenopathy with left breast Cancer relapse in May on chemo presently), HTN sent from blood in stool and  blood tinged saliva and epigastric pain     GOC : DNR/DNI,  [place]olst in chart
68 yo female with PMH of left breast Cancer (Dx in 2000, s/p Lumpectomy/Lymphadenopathy with left breast Cancer relapse in May on chemo presently), HTN sent from blood in stool and  blood tinged saliva and epigastric pain, f/u CT A/P    GOC : DNR/DNI,  [place] in chart

## 2022-04-21 NOTE — PATIENT PROFILE ADULT - FUNCTIONAL SCREEN CURRENT LEVEL: SWALLOWING (IF SCORE 2 OR MORE FOR ANY ITEM, CONSULT REHAB SERVICES), MLM)
Triage note    Pt ambulatory to ED with c/o cough and sob x 1 week. Pt reports fever \"in the beginning\", not for the last 3 days. Pt denies any known exposure to covid.   
0 = swallows foods/liquids without difficulty

## 2023-10-16 RX ORDER — RAMIPRIL 5 MG
1 CAPSULE ORAL
Qty: 0 | Refills: 0 | DISCHARGE

## 2024-08-15 NOTE — DISCHARGE NOTE PROVIDER - NSDCDCMDCOMP_GEN_ALL_CORE
Plan: Sunscreen use stressed daily when exposed to sunlight at a level of at least spf30
Detail Level: Zone
This document is complete and the patient is ready for discharge.

## 2024-12-03 NOTE — H&P ADULT - PROBLEM SELECTOR PLAN 5
Plan: Location: face\\nTreatment: IPL: 515, 12j, 10ms, 80% cooling\\n\\n\\nPt has erythema on face today.\\nDiscussed with pt that an IPL treatment can help reduce redness associated with rosacea.\\n\\nDiscussed with pt that the laser/IPL device emits a range of lightwaves that are then tuned and targeted at hemoglobin (the red blood cells in the blood vessels) or melanin (the brown pigment in freckles and age spots). \\nThe light beam passes through the skin and is absorbed by either hemoglobin or melanin resulting in damage to the vessel wall or fragmenting of melanin pigment. \\nThese tiny vessels and the melanin pigment are then absorbed by the body, rendering them less visible.\\nPicture taken today.\\n\\nF/u as needed. Detail Level: Zone IMPROVE VTE Individual Risk Assessment  RISK                                                                Points  [  ] Previous VTE                                                  3  [  ] Thrombophilia                                               2  [  ] Lower limb paralysis                                      2        (unable to hold up >15 seconds)    [x  ] Current Cancer                                              2         (within 6 months)  [x  ] Immobilization > 24 hrs                                1  [  ] ICU/CCU stay > 24 hours                              1  [x  ] Age > 60                                                      1  IMPROVE VTE Score _____4, but DVT proph held in setting of GI bleed DNR/DNI,  Molst form placed in chart Plan: Location: face \\nPrescription: \\n\\nPt states she has breakouts on her chin and it has not Improved \\nPt states she takes sprinolactone 100mg once daily  \\nPt states she would like a regimen that can help treat those spots \\nOverall she is here for further evaluation and treatment \\n\\nPlan: \\n1.) spironolactone 100 mg tablet \\nTake 1 tablet daily with food\\n\\n2.)Plexion 9.8 %-4.8 % topical cleanser \\nWash face, then lather and let sit for 10-30 sec, then rinse off completely. Do daily as needed for flare ups.\\n\\n3.) minocycline  mg tablet,extended release 24 hr \\nTake once daily with food or as needle for flare ups\\n\\n4.) trentoin compound cream - use compound cream nightly with moisturizer \\n\\nDiscussed with patient that this is an immune mediated condition triggered with stress, lack of sleep, and also has a major genetic component\\nEducated patient on Accutane 6 month treatment course, side effects, and iPledge program/requirements (2 negative pregnancy tests 30 days apart required)\\nCommon side effects from therapy: dryness, joint pain, mood changes, headaches, nose bleeds\\nDiscussed with patient that Accutane is a Vitamin A derivative\\nDiscussed with patient that Accutane obliterates oil glands and a cure for acne vs. antibiotics which is a temporary treatment\\nDiscussed with patient that medication is processed by the liver and requires blood work to monitor liver functions\\n\\nDiscussed with pt that we will prescribe minocycline 115mg and Plexion Wash (2-5 minutes) daily to help reduce inflammation.\\n\\nDiscussed with her that she has adult female acne that occurs when there is a fluctuation of hormones\\nWe will continue her on Spironolactone that blocks the hormonal receptors at the skin and hair and works to decrease the inflammatory acne\\n---Educated to not take while pregnant and to watch for orthostatic hypotension which can develop if there is too much diuretic effect\\n\\nWill f/u as needed Plan: Location: face\\nPrescribed: HQ 4% Tretinoin 0.025% compound cream QHS\\n\\nPt has hyperpigmentation on face today.\\nDiscussed with pt that this pigmentation is due to sun damage to the skin, advised patient to use SPF daily.\\nDiscussed with pt that we will start patient on HQ 4% Tretinoin 0.025% to use nightly.\\nDiscussed with pt to apply a pea size amount to face, pushing into pores. \\nDiscussed with pt to avoid areas around eyes, nasal crease, and around the lips since skin is thin and may get irritated easily.\\n\\nDiscussed with pt that it will come in a box that says keep refrigerated.\\nDiscussed with pt that they do not need to keep it refrigerated, but have to keep it in a dark place since sunlight may oxidize it.\\nDiscussed with pt that if skin becomes irritated while using cream, then pt can change frequency to every other night, or every other other night, etc.\\nDiscussed with pt that results are not immediate and may take up to a month to notice change.\\nAdvised pt to apply a moisturizing cream such as Cerave afterwards to help reestablish a healthy skin barrier.\\nF/u as needed.